# Patient Record
Sex: MALE | Race: WHITE | NOT HISPANIC OR LATINO | Employment: UNEMPLOYED | RURAL
[De-identification: names, ages, dates, MRNs, and addresses within clinical notes are randomized per-mention and may not be internally consistent; named-entity substitution may affect disease eponyms.]

---

## 2023-03-26 ENCOUNTER — HOSPITAL ENCOUNTER (INPATIENT)
Facility: HOSPITAL | Age: 33
LOS: 2 days | Discharge: ANOTHER HOSPITAL | DRG: 918 | End: 2023-03-28
Attending: EMERGENCY MEDICINE | Admitting: INTERNAL MEDICINE

## 2023-03-26 DIAGNOSIS — T39.1X2A INTENTIONAL ACETAMINOPHEN OVERDOSE, INITIAL ENCOUNTER: Primary | ICD-10-CM

## 2023-03-26 DIAGNOSIS — T14.91XA SUICIDE ATTEMPT: ICD-10-CM

## 2023-03-26 DIAGNOSIS — T50.902D INTENTIONAL OVERDOSE, SUBSEQUENT ENCOUNTER: ICD-10-CM

## 2023-03-26 DIAGNOSIS — T50.901A OVERDOSE: ICD-10-CM

## 2023-03-26 LAB
ALBUMIN SERPL BCP-MCNC: 3.9 G/DL (ref 3.5–5)
ALBUMIN/GLOB SERPL: 1.3 {RATIO}
ALP SERPL-CCNC: 176 U/L (ref 45–115)
ALT SERPL W P-5'-P-CCNC: 729 U/L (ref 16–61)
AMMONIA PLAS-SCNC: 40 ΜMOL/L (ref 11–32)
AMPHET UR QL SCN: POSITIVE
ANION GAP SERPL CALCULATED.3IONS-SCNC: 16 MMOL/L (ref 7–16)
APAP SERPL-MCNC: 16 ΜG/ML (ref 10–30)
APAP SERPL-MCNC: 35 ΜG/ML (ref 10–30)
APTT PPP: 29.1 SECONDS (ref 25.2–37.3)
AST SERPL W P-5'-P-CCNC: 497 U/L (ref 15–37)
BACTERIA #/AREA URNS HPF: ABNORMAL /HPF
BARBITURATES UR QL SCN: NEGATIVE
BASOPHILS # BLD AUTO: 0.08 K/UL (ref 0–0.2)
BASOPHILS NFR BLD AUTO: 0.5 % (ref 0–1)
BENZODIAZ METAB UR QL SCN: NEGATIVE
BILIRUB SERPL-MCNC: 2.8 MG/DL (ref ?–1.2)
BILIRUB UR QL STRIP: NEGATIVE
BUN SERPL-MCNC: 13 MG/DL (ref 7–18)
BUN/CREAT SERPL: 14 (ref 6–20)
CALCIUM SERPL-MCNC: 9.2 MG/DL (ref 8.5–10.1)
CANNABINOIDS UR QL SCN: POSITIVE
CHLORIDE SERPL-SCNC: 97 MMOL/L (ref 98–107)
CLARITY UR: CLEAR
CO2 SERPL-SCNC: 25 MMOL/L (ref 21–32)
COCAINE UR QL SCN: NEGATIVE
COLOR UR: YELLOW
CREAT SERPL-MCNC: 0.95 MG/DL (ref 0.7–1.3)
DIFFERENTIAL METHOD BLD: ABNORMAL
EGFR (NO RACE VARIABLE) (RUSH/TITUS): 110 ML/MIN/1.73M²
EOSINOPHIL # BLD AUTO: 0.02 K/UL (ref 0–0.5)
EOSINOPHIL NFR BLD AUTO: 0.1 % (ref 1–4)
ERYTHROCYTE [DISTWIDTH] IN BLOOD BY AUTOMATED COUNT: 13.6 % (ref 11.5–14.5)
ETHANOL, BLOOD (CATEGORY): NOT DETECTED
GLOBULIN SER-MCNC: 3.1 G/DL (ref 2–4)
GLUCOSE SERPL-MCNC: 154 MG/DL (ref 74–106)
GLUCOSE UR STRIP-MCNC: 70 MG/DL
HCO3 UR-SCNC: 29.1 MMOL/L (ref 24–28)
HCT VFR BLD AUTO: 49.8 % (ref 40–54)
HCT VFR BLD CALC: 51 % (ref 35–51)
HGB BLD-MCNC: 16.1 G/DL (ref 13.5–18)
HYALINE CASTS #/AREA URNS LPF: ABNORMAL /LPF
IMM GRANULOCYTES # BLD AUTO: 0.07 K/UL (ref 0–0.04)
IMM GRANULOCYTES NFR BLD: 0.4 % (ref 0–0.4)
INR BLD: 1.56
KETONES UR STRIP-SCNC: 150 MG/DL
LDH SERPL L TO P-CCNC: 2 MMOL/L (ref 0.3–1.2)
LEUKOCYTE ESTERASE UR QL STRIP: ABNORMAL
LYMPHOCYTES # BLD AUTO: 1.33 K/UL (ref 1–4.8)
LYMPHOCYTES NFR BLD AUTO: 7.6 % (ref 27–41)
MCH RBC QN AUTO: 29.6 PG (ref 27–31)
MCHC RBC AUTO-ENTMCNC: 32.3 G/DL (ref 32–36)
MCV RBC AUTO: 91.5 FL (ref 80–96)
MONOCYTES # BLD AUTO: 0.83 K/UL (ref 0–0.8)
MONOCYTES NFR BLD AUTO: 4.7 % (ref 2–6)
MPC BLD CALC-MCNC: 9.5 FL (ref 9.4–12.4)
NEUTROPHILS # BLD AUTO: 15.24 K/UL (ref 1.8–7.7)
NEUTROPHILS NFR BLD AUTO: 86.7 % (ref 53–65)
NITRITE UR QL STRIP: NEGATIVE
NRBC # BLD AUTO: 0 X10E3/UL
NRBC, AUTO (.00): 0 %
OPIATES UR QL SCN: NEGATIVE
PCO2 BLDA: 47 MMHG (ref 41–51)
PCP UR QL SCN: NEGATIVE
PH SMN: 7.4 [PH] (ref 7.32–7.42)
PH UR STRIP: 6 PH UNITS
PHOSPHATE SERPL-MCNC: 3.9 MG/DL (ref 2.5–4.5)
PLATELET # BLD AUTO: 488 K/UL (ref 150–400)
PO2 BLDA: 28 MMHG (ref 25–40)
POC BASE EXCESS: 3.3 MMOL/L (ref -2–3)
POC CO2: 30.5 MMOL/L
POC IONIZED CALCIUM: 1.04 MMOL/L (ref 1.15–1.35)
POC SATURATED O2: 53 % (ref 40–70)
POCT GLUCOSE: 156 MG/DL (ref 60–95)
POTASSIUM BLD-SCNC: 3.9 MMOL/L (ref 3.4–4.5)
POTASSIUM SERPL-SCNC: 4.9 MMOL/L (ref 3.5–5.1)
PROT SERPL-MCNC: 7 G/DL (ref 6.4–8.2)
PROT UR QL STRIP: 30
PROTHROMBIN TIME: 18 SECONDS (ref 11.7–14.7)
RBC # BLD AUTO: 5.44 M/UL (ref 4.6–6.2)
RBC # UR STRIP: NEGATIVE /UL
RBC #/AREA URNS HPF: ABNORMAL /HPF
SALICYLATES SERPL-MCNC: <0.2 MG/DL (ref 3–30)
SARS-COV-2 RDRP RESP QL NAA+PROBE: NEGATIVE
SODIUM BLD-SCNC: 129 MMOL/L (ref 136–145)
SODIUM SERPL-SCNC: 133 MMOL/L (ref 136–145)
SP GR UR STRIP: 1.03
SQUAMOUS #/AREA URNS LPF: ABNORMAL /LPF
UROBILINOGEN UR STRIP-ACNC: 6 MG/DL
WBC # BLD AUTO: 17.57 K/UL (ref 4.5–11)
WBC #/AREA URNS HPF: ABNORMAL /HPF
YEAST #/AREA URNS HPF: ABNORMAL /HPF

## 2023-03-26 PROCEDURE — 63600175 PHARM REV CODE 636 W HCPCS

## 2023-03-26 PROCEDURE — 82330 ASSAY OF CALCIUM: CPT

## 2023-03-26 PROCEDURE — 82947 ASSAY GLUCOSE BLOOD QUANT: CPT

## 2023-03-26 PROCEDURE — 96365 THER/PROPH/DIAG IV INF INIT: CPT

## 2023-03-26 PROCEDURE — 93010 EKG 12-LEAD: ICD-10-PCS | Mod: ,,, | Performed by: INTERNAL MEDICINE

## 2023-03-26 PROCEDURE — 99285 EMERGENCY DEPT VISIT HI MDM: CPT | Mod: ,,,

## 2023-03-26 PROCEDURE — 99285 PR EMERGENCY DEPT VISIT,LEVEL V: ICD-10-PCS | Mod: ,,,

## 2023-03-26 PROCEDURE — 83605 ASSAY OF LACTIC ACID: CPT

## 2023-03-26 PROCEDURE — 82140 ASSAY OF AMMONIA: CPT | Performed by: EMERGENCY MEDICINE

## 2023-03-26 PROCEDURE — 25000003 PHARM REV CODE 250

## 2023-03-26 PROCEDURE — 85014 HEMATOCRIT: CPT

## 2023-03-26 PROCEDURE — 93005 ELECTROCARDIOGRAM TRACING: CPT

## 2023-03-26 PROCEDURE — 84100 ASSAY OF PHOSPHORUS: CPT | Performed by: EMERGENCY MEDICINE

## 2023-03-26 PROCEDURE — 87635 SARS-COV-2 COVID-19 AMP PRB: CPT

## 2023-03-26 PROCEDURE — 99285 EMERGENCY DEPT VISIT HI MDM: CPT

## 2023-03-26 PROCEDURE — 99222 1ST HOSP IP/OBS MODERATE 55: CPT | Mod: ,,, | Performed by: INTERNAL MEDICINE

## 2023-03-26 PROCEDURE — 80143 DRUG ASSAY ACETAMINOPHEN: CPT | Performed by: INTERNAL MEDICINE

## 2023-03-26 PROCEDURE — 84295 ASSAY OF SERUM SODIUM: CPT

## 2023-03-26 PROCEDURE — 80307 DRUG TEST PRSMV CHEM ANLYZR: CPT

## 2023-03-26 PROCEDURE — 80143 DRUG ASSAY ACETAMINOPHEN: CPT

## 2023-03-26 PROCEDURE — 81001 URINALYSIS AUTO W/SCOPE: CPT

## 2023-03-26 PROCEDURE — 85025 COMPLETE CBC W/AUTO DIFF WBC: CPT

## 2023-03-26 PROCEDURE — 99222 PR INITIAL HOSPITAL CARE,LEVL II: ICD-10-PCS | Mod: ,,, | Performed by: INTERNAL MEDICINE

## 2023-03-26 PROCEDURE — 80179 DRUG ASSAY SALICYLATE: CPT

## 2023-03-26 PROCEDURE — 82077 ASSAY SPEC XCP UR&BREATH IA: CPT

## 2023-03-26 PROCEDURE — 85730 THROMBOPLASTIN TIME PARTIAL: CPT

## 2023-03-26 PROCEDURE — 85610 PROTHROMBIN TIME: CPT

## 2023-03-26 PROCEDURE — 93010 ELECTROCARDIOGRAM REPORT: CPT | Mod: ,,, | Performed by: INTERNAL MEDICINE

## 2023-03-26 PROCEDURE — 84132 ASSAY OF SERUM POTASSIUM: CPT

## 2023-03-26 PROCEDURE — 96376 TX/PRO/DX INJ SAME DRUG ADON: CPT

## 2023-03-26 PROCEDURE — 80053 COMPREHEN METABOLIC PANEL: CPT

## 2023-03-26 PROCEDURE — 20000000 HC ICU ROOM

## 2023-03-26 PROCEDURE — 82803 BLOOD GASES ANY COMBINATION: CPT

## 2023-03-26 RX ORDER — TALC
6 POWDER (GRAM) TOPICAL NIGHTLY PRN
Status: DISCONTINUED | OUTPATIENT
Start: 2023-03-26 | End: 2023-03-29 | Stop reason: HOSPADM

## 2023-03-26 RX ORDER — POLYETHYLENE GLYCOL 3350 17 G/17G
17 POWDER, FOR SOLUTION ORAL DAILY
Status: DISCONTINUED | OUTPATIENT
Start: 2023-03-27 | End: 2023-03-29 | Stop reason: HOSPADM

## 2023-03-26 RX ORDER — ACETAMINOPHEN 325 MG/1
650 TABLET ORAL EVERY 4 HOURS PRN
Status: DISCONTINUED | OUTPATIENT
Start: 2023-03-26 | End: 2023-03-26

## 2023-03-26 RX ORDER — LANOLIN ALCOHOL/MO/W.PET/CERES
800 CREAM (GRAM) TOPICAL
Status: DISCONTINUED | OUTPATIENT
Start: 2023-03-26 | End: 2023-03-29 | Stop reason: HOSPADM

## 2023-03-26 RX ORDER — NALOXONE HCL 0.4 MG/ML
0.02 VIAL (ML) INJECTION
Status: DISCONTINUED | OUTPATIENT
Start: 2023-03-26 | End: 2023-03-29 | Stop reason: HOSPADM

## 2023-03-26 RX ORDER — SODIUM,POTASSIUM PHOSPHATES 280-250MG
2 POWDER IN PACKET (EA) ORAL
Status: DISCONTINUED | OUTPATIENT
Start: 2023-03-26 | End: 2023-03-29 | Stop reason: HOSPADM

## 2023-03-26 RX ORDER — SODIUM CHLORIDE 0.9 % (FLUSH) 0.9 %
10 SYRINGE (ML) INJECTION
Status: DISCONTINUED | OUTPATIENT
Start: 2023-03-26 | End: 2023-03-29 | Stop reason: HOSPADM

## 2023-03-26 RX ORDER — GLUCAGON 1 MG
1 KIT INJECTION
Status: DISCONTINUED | OUTPATIENT
Start: 2023-03-26 | End: 2023-03-29 | Stop reason: HOSPADM

## 2023-03-26 RX ORDER — ACETAMINOPHEN 500 MG
1000 TABLET ORAL EVERY 8 HOURS PRN
Status: DISCONTINUED | OUTPATIENT
Start: 2023-03-26 | End: 2023-03-26

## 2023-03-26 RX ADMIN — ACETYLCYSTEINE 10900 MG: 200 INJECTION INTRAVENOUS at 03:03

## 2023-03-26 RX ADMIN — ACETYLCYSTEINE 7300 MG: 200 INJECTION INTRAVENOUS at 09:03

## 2023-03-26 RX ADMIN — ACETYLCYSTEINE 3600 MG: 200 INJECTION INTRAVENOUS at 04:03

## 2023-03-26 NOTE — ED PROVIDER NOTES
Encounter Date: 3/26/2023       History     Chief Complaint   Patient presents with    Drug Overdose     Patient is a 33 yo male who presents to ED via EMS after he reportedly took 500 tablets of Acetaminophen 500 mg yesterday around 2 pm. Patient reports nausea and vomiting. Denies pain. States he was trying to kill himself. History of previous suicide attempt with tylenol overdose approximately 3 years ago. Denies any other drug or alcohol use.     The history is provided by the patient and the EMS personnel.   Review of patient's allergies indicates:  No Known Allergies  History reviewed. No pertinent past medical history.  History reviewed. No pertinent surgical history.  History reviewed. No pertinent family history.  Social History     Tobacco Use    Smoking status: Every Day     Types: Cigarettes    Smokeless tobacco: Never   Substance Use Topics    Alcohol use: Not Currently    Drug use: Not Currently     Review of Systems   Constitutional:  Negative for fever.   HENT:  Negative for sore throat.    Respiratory:  Negative for shortness of breath.    Cardiovascular:  Negative for chest pain.   Gastrointestinal:  Positive for nausea and vomiting.   Genitourinary:  Negative for dysuria.   Musculoskeletal:  Negative for back pain.   Skin:  Negative for rash.   Neurological:  Negative for weakness.   Hematological:  Does not bruise/bleed easily.   Psychiatric/Behavioral:  Positive for suicidal ideas.      Physical Exam     Initial Vitals [03/26/23 1412]   BP Pulse Resp Temp SpO2   134/69 89 16 97.8 °F (36.6 °C) 100 %      MAP       --         Physical Exam    Vitals reviewed.  Constitutional: No distress.   HENT:   Head: Normocephalic.   Mouth/Throat: Oropharynx is clear and moist.   Eyes: Conjunctivae are normal. Pupils are equal, round, and reactive to light.   Neck: Neck supple.   Normal range of motion.  Cardiovascular:  Normal rate, regular rhythm, normal heart sounds and intact distal pulses.            Pulmonary/Chest: Breath sounds normal. No respiratory distress.   Abdominal: Abdomen is soft. Bowel sounds are normal. There is no abdominal tenderness.   Musculoskeletal:         General: No tenderness or edema. Normal range of motion.      Cervical back: Normal range of motion and neck supple.     Neurological: He is alert and oriented to person, place, and time. He has normal strength.   Skin: Skin is warm and dry.   Psychiatric: He expresses suicidal ideation.       Medical Screening Exam   See Full Note    ED Course   Procedures  Labs Reviewed   COMPREHENSIVE METABOLIC PANEL - Abnormal; Notable for the following components:       Result Value    Sodium 133 (*)     Chloride 97 (*)     Glucose 154 (*)     Bilirubin, Total 2.8 (*)     Alk Phos 176 (*)      (*)      (*)     All other components within normal limits   PROTIME-INR - Abnormal; Notable for the following components:    PT 18.0 (*)     All other components within normal limits   ACETAMINOPHEN LEVEL - Abnormal; Notable for the following components:    Acetaminophen 35 (*)     All other components within normal limits   SALICYLATE LEVEL - Abnormal; Notable for the following components:    Salicylate <0.2 (*)     All other components within normal limits   URINALYSIS, REFLEX TO URINE CULTURE - Abnormal; Notable for the following components:    Leukocytes, UA Small (*)     Protein, UA 30 (*)     Glucose, UA 70 (*)     Ketones,  (*)     Urobilinogen, UA 6 (*)     Specific Gravity, UA 1.032 (*)     All other components within normal limits   CBC WITH DIFFERENTIAL - Abnormal; Notable for the following components:    WBC 17.57 (*)     Platelet Count 488 (*)     Neutrophils % 86.7 (*)     Lymphocytes % 7.6 (*)     Eosinophils % 0.1 (*)     Neutrophils, Abs 15.24 (*)     Monocytes, Absolute 0.83 (*)     Immature Granulocytes, Absolute 0.07 (*)     All other components within normal limits   AMMONIA - Abnormal; Notable for the following  components:    Ammonia 40 (*)     All other components within normal limits   URINALYSIS, MICROSCOPIC - Abnormal; Notable for the following components:    Hyaline Casts, UA 0-2 (*)     All other components within normal limits   APTT - Normal   SARS-COV-2 RNA AMPLIFICATION, QUAL - Normal    Narrative:     Negative SARS-CoV results should not be used as the sole basis for treatment or patient management decisions; negative results should be considered in the context of a patient's recent exposures, history and the presene of clinical signs and symptoms consistent with COVID-19.  Negative results should be treated as presumptive and confirmed by molecular assay, if necessary for patient management.   PHOSPHORUS - Normal   CBC W/ AUTO DIFFERENTIAL    Narrative:     The following orders were created for panel order CBC auto differential.  Procedure                               Abnormality         Status                     ---------                               -----------         ------                     CBC with Differential[[id number]]        Abnormal            Final result                 Please view results for these tests on the individual orders.   ALCOHOL,MEDICAL (ETHANOL)        ECG Results              EKG 12-lead (In process)  Result time 03/27/23 06:40:20      In process by Interface, Lab In [facility] (03/27/23 06:40:20)                   Narrative:    Test Reason : T50.901A,    Vent. Rate : 078 BPM     Atrial Rate : 000 BPM     P-R Int : 100 ms          QRS Dur : 094 ms      QT Int : 396 ms       P-R-T Axes : 077 080 066 degrees     QTc Int : 428 ms    Sinus arrhythmia  Short SC interval  Borderline ECG      Referred By: [email or web address]   SELF           Confirmed By:                                   Imaging Results    None          Medications   sodium chloride 0.9% flush 10 mL (has no administration in time range)   melatonin tablet 6 mg (has no administration in time range)   polyethylene glycol packet 17 g  (17 g Oral Given 3/27/23 0825)   naloxone 0.4 mg/mL injection 0.02 mg (has no administration in time range)   potassium bicarbonate disintegrating tablet 50 mEq (has no administration in time range)   potassium bicarbonate disintegrating tablet 35 mEq (has no administration in time range)   potassium bicarbonate disintegrating tablet 60 mEq (has no administration in time range)   magnesium oxide tablet 800 mg (has no administration in time range)   magnesium oxide tablet 800 mg (has no administration in time range)   potassium, sodium phosphates 280-160-250 mg packet 2 packet (has no administration in time range)   potassium, sodium phosphates 280-160-250 mg packet 2 packet (has no administration in time range)   potassium, sodium phosphates 280-160-250 mg packet 2 packet (has no administration in time range)   glucagon (human recombinant) injection 1 mg (has no administration in time range)   dextrose 10% bolus 250 mL 250 mL (has no administration in time range)   nicotine 21 mg/24 hr 1 patch (1 patch Transdermal Patch Applied 3/27/23 1402)   mupirocin 2 % ointment ( Nasal Given 3/27/23 1747)   acetylcysteine 20% (200 mg/ml) (ACETADOTE) 7,300 mg in dextrose 5 % (D5W) 1,000 mL infusion ( Intravenous Verify Only 3/27/23 1801)   acetylcysteine 20% (200 mg/ml) (ACETADOTE) 10,900 mg in dextrose 5 % (D5W) 250 mL infusion (0 mg Intravenous Stopped 3/26/23 1610)   acetylcysteine 20% (200 mg/ml) (ACETADOTE) 3,600 mg in dextrose 5 % (D5W) 500 mL infusion (0 mg Intravenous Stopped 3/26/23 2141)   acetylcysteine 20% (200 mg/ml) (ACETADOTE) 7,300 mg in dextrose 5 % (D5W) 1,000 mL infusion (0 mg Intravenous Stopped 3/27/23 1342)     Medical Decision Making:   Clinical Tests:   Lab Tests: Ordered and Reviewed  The following lab test(s) were unremarkable: CBC and CMP       <> Summary of Lab: Tbili elevated at 2.8; ; ;    ED Management:  RN spoke with Morenita at [facility]. She recommends all overdose  labs (tylenol level, salicylate level, UA, Drug screen, Ethanol)... Physician might want to start the Mucomyst before tylenol level returns... Bag 1 (150mg/kg in 200ml d5w, 60 minz), Bag 2 (50mg/kg in 500ml d5w, 4 hrs), Bag 3 (100mg/kg in 1000 d5w, 16 hrs) CMP 2 hours before 3rd bags is over check liver enzymes & INR. Tylenol might not be terribly high but the liver function is what we will base it on. She will call back in 2 hours.    NAC infusing; patient alert, oriented. Will admit to ICU.     [facility]    Patient presents for emergent evaluation of acute Tylenol overdose that poses a threat to life and/or bodily function.    In the ED patient found to have acute Tylenol overdose.    Emergency provider ordered labs and personally reviewed them.  Labs significant for elevated bilirubin and transaminases.    Emergency provider ordered EKG and personally reviewed it.  EKG significant for normal sinus rhythm no ST elevation.      Admission [facility]  Emergency provider discussed the patient presentation labs, ekg, X-rays, CT findings with the consultant for hospital medicine/ICU team (speciality).    Patient was managed in the ED with IV N-acetylcysteine.    The response to treatment was stable.    Patient required emergent consultation to Hospital Medicine (admitting physician) for admission.             Attending Attestation:     Physician Attestation Statement for NP/PA:   I reviewed the chart but I did not personally examine the patient. The face to face encounter was performed by the NP/PA.    Other NP/PA Attestation Additions:      Medical Decision Making: Discussed case with nurse practitioner we decided to admit the patient for significant Tylenol overdose with some abnormality of bilirubin and transaminases.  Started on N-acetylcysteine.                 Clinical Impression:   Final diagnoses:  [T50.901A] Overdose  [T39.1X2A] Intentional acetaminophen overdose, initial encounter (Primary)        ED Disposition Condition     Admit Stable                CYNTHIA Solano  03/26/23 1636       Luisito Montalvo MD  03/27/23 8784

## 2023-03-26 NOTE — H&P
[facility] Hospital Medicine  History & Physical    Patient Name: Tristin Desai  MRN: [id number]  Patient Class: IP- Inpatient  Admission Date: 3/26/2023  Attending Physician: Kirby Arenas MD   Primary Care Provider: Primary Doctor No         Patient information was obtained from patient and ER records.     Subjective:     Principal Problem:Overdose    Chief Complaint:   Chief Complaint   Patient presents with    Drug Overdose        HPI: Chief complaint  I tried to kill myself     History of present illness   32-year-old gentleman presents to the hospital after stating that yesterday at approximately 3:00 p.m. he had taken 500 of 500 mg of Tylenol.  Patient states that after being home for several hours and earlier today he had severe nausea and vomiting then he called 911.  Patient stated he was suicidal yesterday because his children were taken away from him by the state.  Patient claims that now today he is not suicidal.  Patient has been started on N-acetylcysteine, and will be placed in the intensive care unit.  First acetaminophen level is 35.        History reviewed. No pertinent past medical history.    History reviewed. No pertinent surgical history.    Review of patient's allergies indicates:  No Known Allergies    No current facility-administered medications on file prior to encounter.     No current outpatient medications on file prior to encounter.     Family History    None       Tobacco Use    Smoking status: Every Day     Types: Cigarettes    Smokeless tobacco: Never   Substance and Sexual Activity    Alcohol use: Not Currently    Drug use: Not Currently    Sexual activity: Not Currently     Review of Systems   Constitutional:  Negative for activity change and fatigue.   HENT:  Negative for congestion and sore throat.    Eyes:  Negative for redness and visual disturbance.   Respiratory:  Negative for cough, chest tightness and shortness of breath.     Cardiovascular:  Negative for chest pain, palpitations and leg swelling.   Gastrointestinal:  Positive for nausea and vomiting. Negative for abdominal pain, blood in stool, constipation and diarrhea.   Endocrine: Negative for polydipsia and polyuria.   Genitourinary:  Negative for difficulty urinating and dysuria.   Musculoskeletal:  Negative for arthralgias and gait problem.   Skin:  Negative for rash and wound.   Neurological:  Negative for dizziness and weakness.   Psychiatric/Behavioral:  Negative for confusion. The patient is not nervous/anxious.    Objective:     Vital Signs (Most Recent):  Temp: 98.1 °F (36.7 °C) (03/26/23 1700)  Pulse: 67 (03/26/23 1830)  Resp: 15 (03/26/23 1830)  BP: (!) 143/76 (03/26/23 1830)  SpO2: 99 % (03/26/23 1830)   Vital Signs (24h Range):  Temp:  [97.8 °F (36.6 °C)-98.1 °F (36.7 °C)] 98.1 °F (36.7 °C)  Pulse:  [55-92] 67  Resp:  [13-18] 15  SpO2:  [97 %-100 %] 99 %  BP: (132-166)/(69-96) 143/76     Weight: 75.5 kg (166 lb 7.2 oz)  Body mass index is 26.06 kg/m².    Physical Exam  Vitals and nursing note reviewed.   Constitutional:       Appearance: Normal appearance.   HENT:      Head: Normocephalic and atraumatic.      Nose: Nose normal.      Mouth/Throat:      Mouth: Mucous membranes are moist.      Pharynx: Oropharynx is clear.   Eyes:      Extraocular Movements: Extraocular movements intact.      Pupils: Pupils are equal, round, and reactive to light.   Cardiovascular:      Rate and Rhythm: Normal rate and regular rhythm.      Pulses: Normal pulses.      Heart sounds: Normal heart sounds.   Pulmonary:      Effort: Pulmonary effort is normal.      Breath sounds: Normal breath sounds.   Abdominal:      General: Abdomen is flat. Bowel sounds are normal.      Palpations: Abdomen is soft.   Musculoskeletal:         General: Normal range of motion.      Cervical back: Normal range of motion and neck supple.      Right lower leg: No edema.      Left lower leg: No edema.   Skin:      General: Skin is warm and dry.      Coloration: Skin is not jaundiced or pale.      Findings: No rash.   Neurological:      General: No focal deficit present.      Mental Status: He is alert and oriented to person, place, and time. Mental status is at baseline.   Psychiatric:         Mood and Affect: Mood normal.         Thought Content: Thought content normal.         CRANIAL NERVES     CN III, IV, VI   Pupils are equal, round, and reactive to light.     Significant Labs: All pertinent labs within the past 24 hours have been reviewed.  CBC:   Recent Labs   Lab 03/26/23  1435 03/26/23  1647   WBC 17.57*  --    HGB 16.1  --    HCT 49.8 51   *  --      CMP:   Recent Labs   Lab 03/26/23  1435   *   K 4.9   CL 97*   CO2 25   *   BUN 13   CREATININE 0.95   CALCIUM 9.2   PROT 7.0   ALBUMIN 3.9   BILITOT 2.8*   ALKPHOS 176*   *   *   ANIONGAP 16     Recent Lab Results  (Last 5 results in the past 24 hours)        03/26/23  1649   03/26/23  1647   03/26/23  1639   03/26/23  1520   03/26/23  1435        POC CO2   30.5             Benzodiazepines Negative               Cocaine Negative  Comment:   The results of screening tests should be considered presumptive. Confirmatory testing is available upon request.    Cutoff Points:  PCP:         25ng/mL  AMPH:        500ng/mL  PARTHA:        200ng/mL  ERIBERTO:        200ng/mL  THC:         50ng/mL  BERTO:         300ng/mL  OPI:         2000ng/mL               BARBITURATES Negative               Albumin/Globulin Ratio         1.3       Acetaminophen (Tylenol), Serum         35       Albumin         3.9       Alcohol, Serum         Not Detected       Alkaline Phosphatase         176       ALT         729       Ammonia     40           Amphetamine, Urine Positive               Anion Gap         16       Appearance, UA Clear               aPTT         29.1       AST         497       Bacteria, UA Rare               Baso #         0.08       Basophil %          0.5       Bilirubin (UA) Negative               BILIRUBIN TOTAL         2.8       BUN         13       BUN/CREAT RATIO         14       Calcium         9.2       Cannabinoid Scrn, Ur Positive               Chloride         97       CO2         25       Color, UA Yellow               ID NOW COVID-19, (SURINDER)       Negative         Creatinine         0.95       Differential Type         Auto       eGFR         110       Eos #         0.02       Eosinophil %         0.1       Globulin, Total         3.1       Glucose         154       Glucose, UA 70               Hematocrit         49.8       Hemoglobin         16.1       Hyaline Casts, UA 0-2               Immature Grans (Abs)         0.07       Immature Granulocytes         0.4       INR         1.56       Ketones,                Leukocytes, UA Small               Lymph #         1.33       Lymph %         7.6       MCH         29.6       MCHC         32.3       MCV         91.5       Mono #         0.83       Mono %         4.7       MPV         9.5       Neutrophils, Abs         15.24       Neutrophils Relative         86.7       NITRITE UA Negative               nRBC         0.0       NUCLEATED RBC ABSOLUTE         0.00       Occult Blood UA Negative               Opiate Scrn, Ur Negative               pH, UA 6.0               Phencyclidine, Urine Negative               Phosphorus         3.9       Platelets         488       POC Base Excess   3.3             POC HCO3   29.1             POC Hematocrit   51             POC Ionized Calcium   1.04             POC Lactate   2.0             POC PCO2   47             POC PH   7.40             POC PO2   28             POC Potassium   3.9             POC SATURATED O2   53             POC Sodium   129             POCT Glucose   156             Potassium         4.9       PROTEIN TOTAL         7.0       Protein, UA 30               Protime         18.0       RBC         5.44       RBC, UA 0-3               RDW          13.6       Salicylate Lvl         <0.2       Sodium         133       Specific Gravity, UA 1.032               Squam Epithel, UA None Seen To Occasional               UROBILINOGEN UA 6               WBC, UA 0-5               WBC         17.57       Yeast, UA None Seen                                      Significant Imaging: I have reviewed all pertinent imaging results/findings within the past 24 hours.    Assessment/Plan:     * Overdose  Tylenol overdose which was intentional.  Start n  acetylcysteine  Patient will need a psychiatric evaluation in the morning.      Intentional acetaminophen overdose  Follow Tylenol levels   n acetylcysteine  Follow LFTs        VTE Risk Mitigation (From admission, onward)         Ordered     IP VTE LOW RISK PATIENT  Once         03/26/23 1703     Place sequential compression device  Until discontinued         03/26/23 1703                           Kirby Arenas MD  Department of Hospital Medicine  [facility]

## 2023-03-26 NOTE — Clinical Note
Diagnosis: Intentional acetaminophen overdose, initial encounter [026509]   Admitting Provider:: GEETHA CRUZ [[id number]]   Future Attending Provider: GEETHA CRUZ [[id number]]   Reason for IP Medical Treatment  (Clinical interventions that can only be accomplished in the IP setting? ) :: treatment   I certify that Inpatient services for greater than or equal to 2 midnights are medically necessary:: Yes   Plans for Post-Acute care--if anticipated (pick the single best option):: A. No post acute care anticipated at this time

## 2023-03-26 NOTE — HPI
Chief complaint  I tried to kill myself     History of present illness   32-year-old gentleman presents to the hospital after stating that yesterday at approximately 3:00 p.m. he had taken 500 of 500 mg of Tylenol.  Patient states that after being home for several hours and earlier today he had severe nausea and vomiting then he called 911.  Patient stated he was suicidal yesterday because his children were taken away from him by the state.  Patient claims that now today he is not suicidal.  Patient has been started on N-acetylcysteine, and will be placed in the intensive care unit.  First acetaminophen level is 35.

## 2023-03-26 NOTE — ED TRIAGE NOTES
Presents to ED via EMS from home after patient reports he took 500 500mg tylenol tablets yesterday at 1400. Patient reports abdominal pain, reports he has been vomiting since yesterday.

## 2023-03-26 NOTE — ASSESSMENT & PLAN NOTE
Tylenol overdose which was intentional.  Start n  acetylcysteine  Patient will need a psychiatric evaluation in the morning.

## 2023-03-26 NOTE — SUBJECTIVE & OBJECTIVE
History reviewed. No pertinent past medical history.    History reviewed. No pertinent surgical history.    Review of patient's allergies indicates:  No Known Allergies    No current facility-administered medications on file prior to encounter.     No current outpatient medications on file prior to encounter.     Family History    None       Tobacco Use    Smoking status: Every Day     Types: Cigarettes    Smokeless tobacco: Never   Substance and Sexual Activity    Alcohol use: Not Currently    Drug use: Not Currently    Sexual activity: Not Currently     Review of Systems   Constitutional:  Negative for activity change and fatigue.   HENT:  Negative for congestion and sore throat.    Eyes:  Negative for redness and visual disturbance.   Respiratory:  Negative for cough, chest tightness and shortness of breath.    Cardiovascular:  Negative for chest pain, palpitations and leg swelling.   Gastrointestinal:  Positive for nausea and vomiting. Negative for abdominal pain, blood in stool, constipation and diarrhea.   Endocrine: Negative for polydipsia and polyuria.   Genitourinary:  Negative for difficulty urinating and dysuria.   Musculoskeletal:  Negative for arthralgias and gait problem.   Skin:  Negative for rash and wound.   Neurological:  Negative for dizziness and weakness.   Psychiatric/Behavioral:  Negative for confusion. The patient is not nervous/anxious.    Objective:     Vital Signs (Most Recent):  Temp: 98.1 °F (36.7 °C) (03/26/23 1700)  Pulse: 67 (03/26/23 1830)  Resp: 15 (03/26/23 1830)  BP: (!) 143/76 (03/26/23 1830)  SpO2: 99 % (03/26/23 1830)   Vital Signs (24h Range):  Temp:  [97.8 °F (36.6 °C)-98.1 °F (36.7 °C)] 98.1 °F (36.7 °C)  Pulse:  [55-92] 67  Resp:  [13-18] 15  SpO2:  [97 %-100 %] 99 %  BP: (132-166)/(69-96) 143/76     Weight: 75.5 kg (166 lb 7.2 oz)  Body mass index is 26.06 kg/m².    Physical Exam  Vitals and nursing note reviewed.   Constitutional:       Appearance: Normal appearance.    HENT:      Head: Normocephalic and atraumatic.      Nose: Nose normal.      Mouth/Throat:      Mouth: Mucous membranes are moist.      Pharynx: Oropharynx is clear.   Eyes:      Extraocular Movements: Extraocular movements intact.      Pupils: Pupils are equal, round, and reactive to light.   Cardiovascular:      Rate and Rhythm: Normal rate and regular rhythm.      Pulses: Normal pulses.      Heart sounds: Normal heart sounds.   Pulmonary:      Effort: Pulmonary effort is normal.      Breath sounds: Normal breath sounds.   Abdominal:      General: Abdomen is flat. Bowel sounds are normal.      Palpations: Abdomen is soft.   Musculoskeletal:         General: Normal range of motion.      Cervical back: Normal range of motion and neck supple.      Right lower leg: No edema.      Left lower leg: No edema.   Skin:     General: Skin is warm and dry.      Coloration: Skin is not jaundiced or pale.      Findings: No rash.   Neurological:      General: No focal deficit present.      Mental Status: He is alert and oriented to person, place, and time. Mental status is at baseline.   Psychiatric:         Mood and Affect: Mood normal.         Thought Content: Thought content normal.         CRANIAL NERVES     CN III, IV, VI   Pupils are equal, round, and reactive to light.     Significant Labs: All pertinent labs within the past 24 hours have been reviewed.  CBC:   Recent Labs   Lab 03/26/23  1435 03/26/23  1647   WBC 17.57*  --    HGB 16.1  --    HCT 49.8 51   *  --      CMP:   Recent Labs   Lab 03/26/23  1435   *   K 4.9   CL 97*   CO2 25   *   BUN 13   CREATININE 0.95   CALCIUM 9.2   PROT 7.0   ALBUMIN 3.9   BILITOT 2.8*   ALKPHOS 176*   *   *   ANIONGAP 16     Recent Lab Results  (Last 5 results in the past 24 hours)        03/26/23  1649   03/26/23  1647   03/26/23  1639   03/26/23  1520   03/26/23  1435        POC CO2   30.5             Benzodiazepines Negative               Cocaine  Negative  Comment:   The results of screening tests should be considered presumptive. Confirmatory testing is available upon request.    Cutoff Points:  PCP:         25ng/mL  AMPH:        500ng/mL  PARTHA:        200ng/mL  ERIBERTO:        200ng/mL  THC:         50ng/mL  BERTO:         300ng/mL  OPI:         2000ng/mL               BARBITURATES Negative               Albumin/Globulin Ratio         1.3       Acetaminophen (Tylenol), Serum         35       Albumin         3.9       Alcohol, Serum         Not Detected       Alkaline Phosphatase         176       ALT         729       Ammonia     40           Amphetamine, Urine Positive               Anion Gap         16       Appearance, UA Clear               aPTT         29.1       AST         497       Bacteria, UA Rare               Baso #         0.08       Basophil %         0.5       Bilirubin (UA) Negative               BILIRUBIN TOTAL         2.8       BUN         13       BUN/CREAT RATIO         14       Calcium         9.2       Cannabinoid Scrn, Ur Positive               Chloride         97       CO2         25       Color, UA Yellow               ID NOW COVID-19, (SURINDER)       Negative         Creatinine         0.95       Differential Type         Auto       eGFR         110       Eos #         0.02       Eosinophil %         0.1       Globulin, Total         3.1       Glucose         154       Glucose, UA 70               Hematocrit         49.8       Hemoglobin         16.1       Hyaline Casts, UA 0-2               Immature Grans (Abs)         0.07       Immature Granulocytes         0.4       INR         1.56       Ketones,                Leukocytes, UA Small               Lymph #         1.33       Lymph %         7.6       MCH         29.6       MCHC         32.3       MCV         91.5       Mono #         0.83       Mono %         4.7       MPV         9.5       Neutrophils, Abs         15.24       Neutrophils Relative         86.7       NITRITE UA  Negative               nRBC         0.0       NUCLEATED RBC ABSOLUTE         0.00       Occult Blood UA Negative               Opiate Scrn, Ur Negative               pH, UA 6.0               Phencyclidine, Urine Negative               Phosphorus         3.9       Platelets         488       POC Base Excess   3.3             POC HCO3   29.1             POC Hematocrit   51             POC Ionized Calcium   1.04             POC Lactate   2.0             POC PCO2   47             POC PH   7.40             POC PO2   28             POC Potassium   3.9             POC SATURATED O2   53             POC Sodium   129             POCT Glucose   156             Potassium         4.9       PROTEIN TOTAL         7.0       Protein, UA 30               Protime         18.0       RBC         5.44       RBC, UA 0-3               RDW         13.6       Salicylate Lvl         <0.2       Sodium         133       Specific Gravity, UA 1.032               Squam Epithel, UA None Seen To Occasional               UROBILINOGEN UA 6               WBC, UA 0-5               WBC         17.57       Yeast, UA None Seen                                      Significant Imaging: I have reviewed all pertinent imaging results/findings within the past 24 hours.

## 2023-03-27 PROBLEM — D72.829 LEUKOCYTOSIS: Status: ACTIVE | Noted: 2023-03-27

## 2023-03-27 LAB
ALBUMIN SERPL BCP-MCNC: 3.3 G/DL (ref 3.5–5)
ALBUMIN SERPL BCP-MCNC: 3.3 G/DL (ref 3.5–5)
ALBUMIN/GLOB SERPL: 1.1 {RATIO}
ALP SERPL-CCNC: 166 U/L (ref 45–115)
ALP SERPL-CCNC: 166 U/L (ref 45–115)
ALT SERPL W P-5'-P-CCNC: 1541 U/L (ref 16–61)
ALT SERPL W P-5'-P-CCNC: 1578 U/L (ref 16–61)
ANION GAP SERPL CALCULATED.3IONS-SCNC: 12 MMOL/L (ref 7–16)
APAP SERPL-MCNC: 2 ΜG/ML (ref 10–30)
APAP SERPL-MCNC: 2 ΜG/ML (ref 10–30)
APAP SERPL-MCNC: 3 ΜG/ML (ref 10–30)
APAP SERPL-MCNC: 8 ΜG/ML (ref 10–30)
APAP SERPL-MCNC: <2 ΜG/ML (ref 10–30)
APAP SERPL-MCNC: <2 ΜG/ML (ref 10–30)
AST SERPL W P-5'-P-CCNC: 896 U/L (ref 15–37)
AST SERPL W P-5'-P-CCNC: 910 U/L (ref 15–37)
BILIRUB DIRECT SERPL-MCNC: 0.7 MG/DL (ref 0–0.2)
BILIRUB SERPL-MCNC: 1.3 MG/DL (ref ?–1.2)
BILIRUB SERPL-MCNC: 1.4 MG/DL (ref ?–1.2)
BUN SERPL-MCNC: 11 MG/DL (ref 7–18)
BUN/CREAT SERPL: 12 (ref 6–20)
CALCIUM SERPL-MCNC: 9.1 MG/DL (ref 8.5–10.1)
CHLORIDE SERPL-SCNC: 100 MMOL/L (ref 98–107)
CO2 SERPL-SCNC: 29 MMOL/L (ref 21–32)
CREAT SERPL-MCNC: 0.93 MG/DL (ref 0.7–1.3)
EGFR (NO RACE VARIABLE) (RUSH/TITUS): 113 ML/MIN/1.73M²
GLOBULIN SER-MCNC: 3.1 G/DL (ref 2–4)
GLUCOSE SERPL-MCNC: 120 MG/DL (ref 74–106)
HAV IGM SER QL: NORMAL
HBV CORE IGM SER QL: NORMAL
HBV SURFACE AG SERPL QL IA: NORMAL
HCV AB SER QL: NORMAL
INR BLD: 2
POTASSIUM SERPL-SCNC: 4.4 MMOL/L (ref 3.5–5.1)
PROT SERPL-MCNC: 6.4 G/DL (ref 6.4–8.2)
PROT SERPL-MCNC: 6.7 G/DL (ref 6.4–8.2)
PROTHROMBIN TIME: 21.8 SECONDS (ref 11.7–14.7)
SODIUM SERPL-SCNC: 137 MMOL/L (ref 136–145)

## 2023-03-27 PROCEDURE — 97165 OT EVAL LOW COMPLEX 30 MIN: CPT

## 2023-03-27 PROCEDURE — 80076 HEPATIC FUNCTION PANEL: CPT | Performed by: NURSE PRACTITIONER

## 2023-03-27 PROCEDURE — 97161 PT EVAL LOW COMPLEX 20 MIN: CPT

## 2023-03-27 PROCEDURE — 80074 ACUTE HEPATITIS PANEL: CPT | Performed by: INTERNAL MEDICINE

## 2023-03-27 PROCEDURE — 25000003 PHARM REV CODE 250: Performed by: NURSE PRACTITIONER

## 2023-03-27 PROCEDURE — 80053 COMPREHEN METABOLIC PANEL: CPT | Performed by: INTERNAL MEDICINE

## 2023-03-27 PROCEDURE — 80143 DRUG ASSAY ACETAMINOPHEN: CPT | Performed by: INTERNAL MEDICINE

## 2023-03-27 PROCEDURE — 25000003 PHARM REV CODE 250: Performed by: INTERNAL MEDICINE

## 2023-03-27 PROCEDURE — S4991 NICOTINE PATCH NONLEGEND: HCPCS | Performed by: NURSE PRACTITIONER

## 2023-03-27 PROCEDURE — 87040 BLOOD CULTURE FOR BACTERIA: CPT | Performed by: INTERNAL MEDICINE

## 2023-03-27 PROCEDURE — 63600175 PHARM REV CODE 636 W HCPCS: Performed by: NURSE PRACTITIONER

## 2023-03-27 PROCEDURE — 85610 PROTHROMBIN TIME: CPT | Performed by: NURSE PRACTITIONER

## 2023-03-27 PROCEDURE — 99233 PR SUBSEQUENT HOSPITAL CARE,LEVL III: ICD-10-PCS | Mod: ,,, | Performed by: INTERNAL MEDICINE

## 2023-03-27 PROCEDURE — 11000001 HC ACUTE MED/SURG PRIVATE ROOM

## 2023-03-27 PROCEDURE — 99233 SBSQ HOSP IP/OBS HIGH 50: CPT | Mod: ,,, | Performed by: INTERNAL MEDICINE

## 2023-03-27 RX ORDER — MUPIROCIN 20 MG/G
OINTMENT TOPICAL 2 TIMES DAILY
Status: DISCONTINUED | OUTPATIENT
Start: 2023-03-27 | End: 2023-03-29 | Stop reason: HOSPADM

## 2023-03-27 RX ORDER — IBUPROFEN 200 MG
1 TABLET ORAL DAILY
Status: DISCONTINUED | OUTPATIENT
Start: 2023-03-27 | End: 2023-03-29 | Stop reason: HOSPADM

## 2023-03-27 RX ADMIN — POLYETHYLENE GLYCOL 3350 17 G: 17 POWDER, FOR SOLUTION ORAL at 08:03

## 2023-03-27 RX ADMIN — ACETYLCYSTEINE 7300 MG: 200 INJECTION INTRAVENOUS at 05:03

## 2023-03-27 RX ADMIN — MUPIROCIN: 20 OINTMENT TOPICAL at 05:03

## 2023-03-27 RX ADMIN — NICOTINE 1 PATCH: 21 PATCH, EXTENDED RELEASE TRANSDERMAL at 02:03

## 2023-03-27 NOTE — PROGRESS NOTES
[facility]  Pulmonology  Progress Note    Patient Name: Tristin Desai  MRN: [id number]  Admission Date: 3/26/2023  Hospital Length of Stay: 1 days  Code Status: Full Code  Attending Provider: Kirby Arenas MD  Primary Care Provider: Primary Doctor No   Principal Problem: Overdose    Subjective:     Interval History:  Patient without complaints this morning      Objective:     Vital Signs (Most Recent):  Temp: 98.4 °F (36.9 °C) (03/27/23 0303)  Pulse: (!) 57 (03/27/23 0530)  Resp: 14 (03/27/23 0530)  BP: (!) 143/84 (03/27/23 0530)  SpO2: 97 % (03/27/23 0530)   Vital Signs (24h Range):  Temp:  [97.7 °F (36.5 °C)-98.4 °F (36.9 °C)] 98.4 °F (36.9 °C)  Pulse:  [51-94] 57  Resp:  [11-23] 14  SpO2:  [92 %-100 %] 97 %  BP: (120-166)/(63-99) 143/84     Weight: 75.2 kg (165 lb 12.6 oz)  Body mass index is 25.96 kg/m².      Intake/Output Summary (Last 24 hours) at 3/27/2023 0544  Last data filed at 3/27/2023 0419  Gross per 24 hour   Intake 968.75 ml   Output 1700 ml   Net -731.25 ml       Physical Exam  Vitals reviewed.   Constitutional:       Appearance: Normal appearance.      Interventions: He is not intubated.  HENT:      Head: Normocephalic and atraumatic.      Nose: Nose normal.      Mouth/Throat:      Mouth: Mucous membranes are dry.      Pharynx: Oropharynx is clear.   Eyes:      Extraocular Movements: Extraocular movements intact.      Conjunctiva/sclera: Conjunctivae normal.      Pupils: Pupils are equal, round, and reactive to light.   Cardiovascular:      Rate and Rhythm: Normal rate.      Heart sounds: Normal heart sounds. No murmur heard.  Pulmonary:      Effort: Pulmonary effort is normal. He is not intubated.      Breath sounds: Normal breath sounds.   Abdominal:      General: Abdomen is flat. Bowel sounds are normal.      Palpations: Abdomen is soft.   Musculoskeletal:         General: Normal range of motion.      Cervical back: Normal range of motion and neck supple.      Right  lower leg: No edema.      Left lower leg: No edema.   Skin:     General: Skin is warm and dry.      Capillary Refill: Capillary refill takes less than 2 seconds.   Neurological:      General: No focal deficit present.      Mental Status: He is alert and oriented to person, place, and time.   Psychiatric:         Mood and Affect: Mood normal.         Behavior: Behavior normal.     Review of Systems    Vents:       Lines/Drains/Airways       Peripheral Intravenous Line  Duration                  Peripheral IV - Single Lumen 03/26/23 1452 18 G Left Antecubital <1 day         Peripheral IV - Single Lumen 03/26/23 1739 20 G Anterior;Left Forearm <1 day                    Significant Labs:    CBC/Anemia Profile:  Recent Labs   Lab 03/26/23  1435 03/26/23  1647   WBC 17.57*  --    HGB 16.1  --    HCT 49.8 51   *  --    MCV 91.5  --    RDW 13.6  --         Chemistries:  Recent Labs   Lab 03/26/23  1435   *   K 4.9   CL 97*   CO2 25   BUN 13   CREATININE 0.95   CALCIUM 9.2   ALBUMIN 3.9   PROT 7.0   BILITOT 2.8*   ALKPHOS 176*   *   *   PHOS 3.9       Recent Lab Results  (Last 5 results in the past 24 hours)        03/26/23  2346   03/26/23  1933   03/26/23  1649   03/26/23  1647   03/26/23  1639        POC CO2       30.5         Benzodiazepines     Negative           Cocaine     Negative  Comment:   The results of screening tests should be considered presumptive. Confirmatory testing is available upon request.    Cutoff Points:  PCP:         25ng/mL  AMPH:        500ng/mL  PARTHA:        200ng/mL  ERIBERTO:        200ng/mL  THC:         50ng/mL  BERTO:         300ng/mL  OPI:         2000ng/mL           BARBITURATES     Negative           Acetaminophen (Tylenol), Serum 8   16             Ammonia         40       Amphetamine, Urine     Positive           Appearance, UA     Clear           Bacteria, UA     Rare           Bilirubin (UA)     Negative           Cannabinoid Scrn, Ur     Positive           Color,  UA     Yellow           Glucose, UA     70           Hyaline Casts, UA     0-2           Ketones, UA     150           Leukocytes, UA     Small           NITRITE UA     Negative           Occult Blood UA     Negative           Opiate Scrn, Ur     Negative           pH, UA     6.0           Phencyclidine, Urine     Negative           POC Base Excess       3.3         POC HCO3       29.1         POC Hematocrit       51         POC Ionized Calcium       1.04         POC Lactate       2.0         POC PCO2       47         POC PH       7.40         POC PO2       28         POC Potassium       3.9         POC SATURATED O2       53         POC Sodium       129         POCT Glucose       156         Protein, UA     30           RBC, UA     0-3           Specific Gravity, UA     1.032           Squam Epithel, UA     None Seen To Occasional           UROBILINOGEN UA     6           WBC, UA     0-5           Yeast, UA     None Seen                                  Significant Imaging:  I have reviewed all pertinent imaging results/findings within the past 24 hours.    Assessment/Plan:     Psychiatric  Suicide attempt  Situation problems at home    Intentional acetaminophen overdose  Patient being followed along with poison control Center had initial Tylenol level of 35 was down to 8 on last check he had abnormal liver function test we will consult GI    Oncology  Leukocytosis  Uncertain of the cause may just be stress culture    Other  * Overdose  Patient overdosed on Tylenol do a home situation where his children were not able to be seen                 Ryan Bai MD  Pulmonology  [facility]

## 2023-03-27 NOTE — CONSULTS
[facility]  Gastroenterology  Consult Note    Patient Name: Tristin Desai  MRN: [id number]  Admission Date: 3/26/2023  Hospital Length of Stay: 1 days  Code Status: Full Code   Attending Provider: Kirby Arenas MD   Consulting Provider: Yaya Barry MD  Primary Care Physician: Primary Doctor No  Principal Problem:Overdose    Inpatient consult to Gastroenterology  Consult performed by: CYNTHIA Fernandez  Consult ordered by: Ryan Bai MD  Reason for consult: tylenol overdose  Assessment/Recommendations: Seen and examined. Agree with findings of note as detailed below. 33 yo male presents after acetaminophen overdose with suicide attempt.Reports he took 25 grams of Tylenol Saturday afternoon and presented ~24 hrs later with c/o nausea.Denies ETOH use recentluy past few years. He currently appears stable on Mucomyst with APAP level from 35 intially down to 2. He is alert and oriented. Labs reviewed- ALT 1500 today. Pt appears stable on Mucomyst tx after significant APAP ingestion. Continue same with Mucomyst protocol with clinical observation, daily INR levels and liver profile.       Subjective:     HPI:  Mr. Desai is a 32-year-old male who was admitted to the hospital with Tylenol drug overdose.  Patient is a good historian therefore information is obtained from patient review as well as chart review.  Patient has no significant prior medical history.  Patient states that he was having a difficult time dealing with a situation at home in which his children were reportedly taken away from him.  Therefore on Saturday afternoon around 3:00 he admits to taking 500 pills of 500 mg Tylenol.  He states within approximately an hour of doing so he became nauseated and not long after that he began vomiting.  He states he continued this for approximately 24 hours prior to calling 911 and being brought to the hospital.  He does admit to being suicidal at that time however once admitted  to the hospital on yesterday he states he no longer is suicidal.  On admission, patient did have labs obtained in which she had elevated WBCs at 17,000.  His H&H is 16/49.  His LFTs on yesterday showed a bilirubin of 2.8 with an AST of 497 and ALT of 729 and an alk phos of 176.  Today his bilirubin is down to 1.4 with an AST of 19 and ALT of 1541.  His initial acetaminophen level was at 35 however today it is trended down now to 2.  He did have abdominal ultrasound done that showed fatty infiltration of the liver with no other acute findings.  He had N-acetylcysteine initiated on admission and was placed in ICU. His nausea and vomiting has resolved at this time. He is having some mild abdominal discomfort this morning. He states he did drink alcohol in the past but has been reportedly abstinent the last several years. He admits to smoking THC.  He states he did attempt suicide prior to this several years ago with Tylenol but he does not recall much about that at this time.      History reviewed. No pertinent past medical history.    History reviewed. No pertinent surgical history.    Review of patient's allergies indicates:  No Known Allergies  Family History    None       Tobacco Use    Smoking status: Every Day     Types: Cigarettes    Smokeless tobacco: Never   Substance and Sexual Activity    Alcohol use: Not Currently    Drug use: Not Currently    Sexual activity: Not Currently     Review of Systems   Constitutional:  Negative for activity change, appetite change, fever and unexpected weight change.   HENT:  Negative for nosebleeds and trouble swallowing.    Eyes:  Negative for visual disturbance.   Respiratory:  Negative for cough and shortness of breath.    Cardiovascular:  Negative for chest pain.   Gastrointestinal:  Positive for abdominal pain. Negative for abdominal distention, anal bleeding, blood in stool, constipation, diarrhea, nausea and vomiting.   Endocrine: Negative for cold intolerance.    Genitourinary:  Negative for dysuria, frequency, hematuria and urgency.   Musculoskeletal:  Negative for arthralgias and myalgias.   Skin:  Negative for color change.   Neurological:  Negative for speech difficulty, weakness and headaches.   Psychiatric/Behavioral:  Positive for suicidal ideas (On admission. States no longer). Negative for agitation, confusion and hallucinations.    Objective:     Vital Signs (Most Recent):  Temp: 97.8 °F (36.6 °C) (03/27/23 1114)  Pulse: 66 (03/27/23 1300)  Resp: 15 (03/27/23 1300)  BP: 124/80 (03/27/23 1300)  SpO2: 95 % (03/27/23 1300) Vital Signs (24h Range):  Temp:  [97.7 °F (36.5 °C)-98.4 °F (36.9 °C)] 97.8 °F (36.6 °C)  Pulse:  [51-94] 66  Resp:  [11-23] 15  SpO2:  [92 %-100 %] 95 %  BP: (116-166)/(63-99) 124/80     Weight: 75.2 kg (165 lb 12.6 oz) (03/27/23 0242)  Body mass index is 25.96 kg/m².      Intake/Output Summary (Last 24 hours) at 3/27/2023 1312  Last data filed at 3/27/2023 1114  Gross per 24 hour   Intake 3554.59 ml   Output 2300 ml   Net 1254.59 ml       Lines/Drains/Airways       Peripheral Intravenous Line  Duration                  Peripheral IV - Single Lumen 03/26/23 1452 18 G Left Antecubital <1 day         Peripheral IV - Single Lumen 03/26/23 1739 20 G Anterior;Left Forearm <1 day                    Physical Exam    Significant Labs:  Recent Lab Results  (Last 5 results in the past 24 hours)        03/27/23  1209   03/27/23  0957   03/27/23  0744   03/27/23  0429   03/26/23  2346        Albumin/Globulin Ratio 1.1               Acetaminophen (Tylenol), Serum 2     2   3   8       Albumin 3.3   3.3             Alkaline Phosphatase 166   166             ALT 1,578   1,541             Anion Gap 12                  910             Bilirubin Direct   0.7             BILIRUBIN TOTAL 1.3   1.4             BUN 11               BUN/CREAT RATIO 12               Calcium 9.1               Chloride 100               CO2 29               Creatinine 0.93                eGFR 113               Globulin, Total 3.1               Glucose 120               Hep A IgM       Non-Reactive         Hep B C IgM       Non-Reactive         Hepatitis B Surface Ag       Non-Reactive         Hepatitis C Ab       Non-Reactive         Potassium 4.4               PROTEIN TOTAL 6.4   6.7             Sodium 137                                      Significant Imaging:  Imaging results within the past 24 hours have been reviewed.    Assessment/Plan:     Psychiatric  Intentional acetaminophen overdose  3/27/2023-patient admitted following Tylenol overdose.  Labs reviewed as above.  Patient is receiving N-acetylcysteine.  No further nausea or vomiting.  Imaging is reviewed.  We will continue to trend LFTs at this time.  Continue to avoid hepatotoxic medications when possible.  Will review case further with Dr. Barry, with plan and addendum to follow.        Thank you for your consult. I will follow-up with patient. Please contact us if you have any additional questions.    CYNTHIA Fernandez  Gastroenterology  [facility]

## 2023-03-27 NOTE — HPI
32-year-old white male who took a large 5. Mg tolerate states that after being home for several hours he would nausea and vomiting called by 911.  This was done because he was unable to see his children he denies any fever chills.  Poison Center was contacted he was placed on Mucomyst.  Patient denies any other problems at this time.  Workup in the ER showed a [place] count of [address], INR 1.56, chemistry profile showed glucose 154 alk-phos 176 total bili of 2.8 AST 47 an ALT of 729 had no alcohol in his system

## 2023-03-27 NOTE — SUBJECTIVE & OBJECTIVE
History reviewed. No pertinent past medical history.    History reviewed. No pertinent surgical history.    Review of patient's allergies indicates:  No Known Allergies  Family History    None       Tobacco Use    Smoking status: Every Day     Types: Cigarettes    Smokeless tobacco: Never   Substance and Sexual Activity    Alcohol use: Not Currently    Drug use: Not Currently    Sexual activity: Not Currently     Review of Systems   Constitutional:  Negative for activity change, appetite change, fever and unexpected weight change.   HENT:  Negative for nosebleeds and trouble swallowing.    Eyes:  Negative for visual disturbance.   Respiratory:  Negative for cough and shortness of breath.    Cardiovascular:  Negative for chest pain.   Gastrointestinal:  Positive for abdominal pain. Negative for abdominal distention, anal bleeding, blood in stool, constipation, diarrhea, nausea and vomiting.   Endocrine: Negative for cold intolerance.   Genitourinary:  Negative for dysuria, frequency, hematuria and urgency.   Musculoskeletal:  Negative for arthralgias and myalgias.   Skin:  Negative for color change.   Neurological:  Negative for speech difficulty, weakness and headaches.   Psychiatric/Behavioral:  Positive for suicidal ideas (On admission. States no longer). Negative for agitation, confusion and hallucinations.    Objective:     Vital Signs (Most Recent):  Temp: 97.8 °F (36.6 °C) (03/27/23 1114)  Pulse: 66 (03/27/23 1300)  Resp: 15 (03/27/23 1300)  BP: 124/80 (03/27/23 1300)  SpO2: 95 % (03/27/23 1300) Vital Signs (24h Range):  Temp:  [97.7 °F (36.5 °C)-98.4 °F (36.9 °C)] 97.8 °F (36.6 °C)  Pulse:  [51-94] 66  Resp:  [11-23] 15  SpO2:  [92 %-100 %] 95 %  BP: (116-166)/(63-99) 124/80     Weight: 75.2 kg (165 lb 12.6 oz) (03/27/23 0242)  Body mass index is 25.96 kg/m².      Intake/Output Summary (Last 24 hours) at 3/27/2023 1312  Last data filed at 3/27/2023 1114  Gross per 24 hour   Intake 3554.59 ml   Output 2300 ml    Net 1254.59 ml       Lines/Drains/Airways       Peripheral Intravenous Line  Duration                  Peripheral IV - Single Lumen 03/26/23 1452 18 G Left Antecubital <1 day         Peripheral IV - Single Lumen 03/26/23 1739 20 G Anterior;Left Forearm <1 day                    Physical Exam    Significant Labs:  Recent Lab Results  (Last 5 results in the past 24 hours)        03/27/23  1209   03/27/23  0957   03/27/23  0744   03/27/23  0429   03/26/23  2346        Albumin/Globulin Ratio 1.1               Acetaminophen (Tylenol), Serum 2     2   3   8       Albumin 3.3   3.3             Alkaline Phosphatase 166   166             ALT 1,578   1,541             Anion Gap 12                  910             Bilirubin Direct   0.7             BILIRUBIN TOTAL 1.3   1.4             BUN 11               BUN/CREAT RATIO 12               Calcium 9.1               Chloride 100               CO2 29               Creatinine 0.93               eGFR 113               Globulin, Total 3.1               Glucose 120               Hep A IgM       Non-Reactive         Hep B C IgM       Non-Reactive         Hepatitis B Surface Ag       Non-Reactive         Hepatitis C Ab       Non-Reactive         Potassium 4.4               PROTEIN TOTAL 6.4   6.7             Sodium 137                                      Significant Imaging:  Imaging results within the past 24 hours have been reviewed.

## 2023-03-27 NOTE — PLAN OF CARE
[facility] ICU  Initial Discharge Assessment       Primary Care Provider: Primary Doctor No    Admission Diagnosis: Overdose [T50.901A]  Intentional acetaminophen overdose, initial encounter [T39.1X2A]    Admission Date: 3/26/2023  Expected Discharge Date:          Payor: /     Extended Emergency Contact Information  Primary Emergency Contact: Tristin Lobo Sr  Mobile Phone: [phone]  Relation: Father  Preferred language: English   needed? No    Discharge Plan A: Home  Discharge Plan B: Home    No Pharmacies Listed    Initial Assessment (most recent)       Adult Discharge Assessment - 03/27/23 1035          Discharge Assessment    Assessment Type Discharge Planning Assessment     Source of Information patient     People in Home alone     Do you expect to return to your current living situation? Yes     Do you have help at home or someone to help you manage your care at home? No     Current cognitive status: Alert/Oriented     Readmission within 30 days? No     Patient currently being followed by outpatient case management? No     Do you currently have service(s) that help you manage your care at home? No     Do you take prescription medications? No     Do you have prescription coverage? No     Do you have any problems affording any of your prescribed medications? No     Is the patient taking medications as prescribed? no     Are you on dialysis? No     Do you take coumadin? No     Discharge Plan A Home     Discharge Plan B Home        Physical Activity    On average, how many days per week do you engage in moderate to strenuous exercise (like a brisk walk)? 0 days     On average, how many minutes do you engage in exercise at this level? 0 min        Financial Resource Strain    How hard is it for you to pay for the very basics like food, housing, medical care, and heating? Somewhat hard        Housing Stability    In the last 12 months, was there a time when you were not able to pay  the mortgage or rent on time? Yes     In the last 12 months, how many places have you lived? 1     In the last 12 months, was there a time when you did not have a steady place to sleep or slept in a shelter (including now)? No        Transportation Needs    In the past 12 months, has lack of transportation kept you from medical appointments or from getting medications? No     In the past 12 months, has lack of transportation kept you from meetings, work, or from getting things needed for daily living? No        Food Insecurity    Within the past 12 months, you worried that your food would run out before you got the money to buy more. Sometimes true     Within the past 12 months, the food you bought just didn't last and you didn't have money to get more. Sometimes true        Stress    Do you feel stress - tense, restless, nervous, or anxious, or unable to sleep at night because your mind is troubled all the time - these days? Very much        Social Connections    In a typical week, how many times do you talk on the phone with family, friends, or neighbors? Never     How often do you get together with friends or relatives? Never     How often do you attend Cheondoism or Mormonism services? Never     Do you belong to any clubs or organizations such as Cheondoism groups, unions, fraternal or athletic groups, or school groups? No     How often do you attend meetings of the clubs or organizations you belong to? Never     Are you , , , , never , or living with a partner? Never         Alcohol Use    Q1: How often do you have a drink containing alcohol? Never     Q2: How many drinks containing alcohol do you have on a typical day when you are drinking? Patient does not drink     Q3: How often do you have six or more drinks on one occasion? Never                 Spoke with patient at bedside. Pt lives alone. Denies any HH or DME. Plans to return home at [place]. SDOH completed at this time.  SS will follow for dc planning.

## 2023-03-27 NOTE — PLAN OF CARE
Problem: Physical Therapy  Goal: Physical Therapy Goal  Description: Short term goal:  To facilitate d/c home patient will demonstrate:  Safe transfers with no AD  Safe gait with no AD    Long term goal:  Patient to return to home situation and all prior ADLs  Outcome: Met     By end of evaluation/gait trial patient was able to demonstrate safe gait, no AD with no LOB. Anticipate return to independent living when medically stable for d/c. No skilled PT indicated.

## 2023-03-27 NOTE — PLAN OF CARE
Problem: Violence Risk or Actual  Goal: Anger and Impulse Control  Outcome: Ongoing, Progressing     Problem: Adult Inpatient Plan of Care  Goal: Plan of Care Review  Outcome: Ongoing, Progressing  Goal: Patient-Specific Goal (Individualized)  Outcome: Ongoing, Progressing  Goal: Absence of Hospital-Acquired Illness or Injury  Outcome: Ongoing, Progressing  Goal: Optimal Comfort and Wellbeing  Outcome: Ongoing, Progressing  Goal: Readiness for Transition of Care  Outcome: Ongoing, Progressing     Problem: Nausea and Vomiting  Goal: Fluid and Electrolyte Balance  Outcome: Ongoing, Progressing     Problem: Suicide Risk  Goal: Absence of Self-Harm  Outcome: Ongoing, Progressing     Problem: Suicidal Behavior  Goal: Suicidal Behavior is Absent or Managed  Outcome: Ongoing, Progressing

## 2023-03-27 NOTE — HPI
Mr. Desai is a 32-year-old male who was admitted to the hospital with Tylenol drug overdose.  Patient is a good historian therefore information is obtained from patient review as well as chart review.  Patient has no significant prior medical history.  Patient states that he was having a difficult time dealing with a situation at home in which his children were reportedly taken away from him.  Therefore on Saturday afternoon around 3:00 he admits to taking 500 pills of 500 mg Tylenol.  He states within approximately an hour of doing so he became nauseated and not long after that he began vomiting.  He states he continued this for approximately 24 hours prior to calling 911 and being brought to the hospital.  He does admit to being suicidal at that time however once admitted to the hospital on yesterday he states he no longer is suicidal.  On admission, patient did have labs obtained in which she had elevated WBCs at 17,000.  His H&H is 16/49.  His LFTs on yesterday showed a bilirubin of 2.8 with an AST of 497 and ALT of 729 and an alk phos of 176.  Today his bilirubin is down to 1.4 with an AST of 19 and ALT of 1541.  His initial acetaminophen level was at 35 however today it is trended down now to 2.  He did have abdominal ultrasound done that showed fatty infiltration of the liver with no other acute findings.  He had N-acetylcysteine initiated on admission and was placed in ICU. His nausea and vomiting has resolved at this time. He is having some mild abdominal discomfort this morning. He states he did drink alcohol in the past but has been reportedly abstinent the last several years. He admits to smoking THC.  He states he did attempt suicide prior to this several years ago with Tylenol but he does not recall much about that at this time.    3/28/23-Patieint is seen, in ICU, up wandering around room with multiple staff members present following initiation of a code strong. He reportedly was wanting to go outside  to smoke and when he wasn't allowed to he threatened to leave so he could go home and die. He was assisted back to his room and reportedly calmed down. Note at this time there is efforts to have patient transferred to [facility] due to worsening of his liver function overnight. His INR is elevated at 3.3 as well as further elevation in his transaminases with AST 4671, ALT 8802. His bilirubin is down at 0.9. Tylenol level remains down as well at 2. He has been complaining of some generalized malaise and nausea with no reports of vomiting. Denies any significant abdominal pain.

## 2023-03-27 NOTE — SUBJECTIVE & OBJECTIVE
Interval History:  Patient without complaints this morning      Objective:     Vital Signs (Most Recent):  Temp: 98.4 °F (36.9 °C) (03/27/23 0303)  Pulse: (!) 57 (03/27/23 0530)  Resp: 14 (03/27/23 0530)  BP: (!) 143/84 (03/27/23 0530)  SpO2: 97 % (03/27/23 0530)   Vital Signs (24h Range):  Temp:  [97.7 °F (36.5 °C)-98.4 °F (36.9 °C)] 98.4 °F (36.9 °C)  Pulse:  [51-94] 57  Resp:  [11-23] 14  SpO2:  [92 %-100 %] 97 %  BP: (120-166)/(63-99) 143/84     Weight: 75.2 kg (165 lb 12.6 oz)  Body mass index is 25.96 kg/m².      Intake/Output Summary (Last 24 hours) at 3/27/2023 0544  Last data filed at 3/27/2023 0419  Gross per 24 hour   Intake 968.75 ml   Output 1700 ml   Net -731.25 ml       Physical Exam  Vitals reviewed.   Constitutional:       Appearance: Normal appearance.      Interventions: He is not intubated.  HENT:      Head: Normocephalic and atraumatic.      Nose: Nose normal.      Mouth/Throat:      Mouth: Mucous membranes are dry.      Pharynx: Oropharynx is clear.   Eyes:      Extraocular Movements: Extraocular movements intact.      Conjunctiva/sclera: Conjunctivae normal.      Pupils: Pupils are equal, round, and reactive to light.   Cardiovascular:      Rate and Rhythm: Normal rate.      Heart sounds: Normal heart sounds. No murmur heard.  Pulmonary:      Effort: Pulmonary effort is normal. He is not intubated.      Breath sounds: Normal breath sounds.   Abdominal:      General: Abdomen is flat. Bowel sounds are normal.      Palpations: Abdomen is soft.   Musculoskeletal:         General: Normal range of motion.      Cervical back: Normal range of motion and neck supple.      Right lower leg: No edema.      Left lower leg: No edema.   Skin:     General: Skin is warm and dry.      Capillary Refill: Capillary refill takes less than 2 seconds.   Neurological:      General: No focal deficit present.      Mental Status: He is alert and oriented to person, place, and time.   Psychiatric:         Mood and Affect:  Mood normal.         Behavior: Behavior normal.     Review of Systems    Vents:       Lines/Drains/Airways       Peripheral Intravenous Line  Duration                  Peripheral IV - Single Lumen 03/26/23 1452 18 G Left Antecubital <1 day         Peripheral IV - Single Lumen 03/26/23 1739 20 G Anterior;Left Forearm <1 day                    Significant Labs:    CBC/Anemia Profile:  Recent Labs   Lab 03/26/23  1435 03/26/23  1647   WBC 17.57*  --    HGB 16.1  --    HCT 49.8 51   *  --    MCV 91.5  --    RDW 13.6  --         Chemistries:  Recent Labs   Lab 03/26/23  1435   *   K 4.9   CL 97*   CO2 25   BUN 13   CREATININE 0.95   CALCIUM 9.2   ALBUMIN 3.9   PROT 7.0   BILITOT 2.8*   ALKPHOS 176*   *   *   PHOS 3.9       Recent Lab Results  (Last 5 results in the past 24 hours)        03/26/23  2346   03/26/23  1933   03/26/23  1649   03/26/23  1647   03/26/23  1639        POC CO2       30.5         Benzodiazepines     Negative           Cocaine     Negative  Comment:   The results of screening tests should be considered presumptive. Confirmatory testing is available upon request.    Cutoff Points:  PCP:         25ng/mL  AMPH:        500ng/mL  PARTHA:        200ng/mL  ERIBERTO:        200ng/mL  THC:         50ng/mL  BERTO:         300ng/mL  OPI:         2000ng/mL           BARBITURATES     Negative           Acetaminophen (Tylenol), Serum 8   16             Ammonia         40       Amphetamine, Urine     Positive           Appearance, UA     Clear           Bacteria, UA     Rare           Bilirubin (UA)     Negative           Cannabinoid Scrn, Ur     Positive           Color, UA     Yellow           Glucose, UA     70           Hyaline Casts, UA     0-2           Ketones, UA     150           Leukocytes, UA     Small           NITRITE UA     Negative           Occult Blood UA     Negative           Opiate Scrn, Ur     Negative           pH, UA     6.0           Phencyclidine, Urine     Negative            POC Base Excess       3.3         POC HCO3       29.1         POC Hematocrit       51         POC Ionized Calcium       1.04         POC Lactate       2.0         POC PCO2       47         POC PH       7.40         POC PO2       28         POC Potassium       3.9         POC SATURATED O2       53         POC Sodium       129         POCT Glucose       156         Protein, UA     30           RBC, UA     0-3           Specific Gravity, UA     1.032           Squam Epithel, UA     None Seen To Occasional           UROBILINOGEN UA     6           WBC, UA     0-5           Yeast, UA     None Seen                                  Significant Imaging:  I have reviewed all pertinent imaging results/findings within the past 24 hours.

## 2023-03-27 NOTE — PT/OT/SLP EVAL
"Physical Therapy Evaluation and Discharge Note    Patient Name:  Tristin Desai   MRN:  [id number]    Recommendations:     Discharge Recommendations: home  Discharge Equipment Recommendations: none   Barriers to discharge: None    Assessment:     Tristin Desai is a 32 y.o. male admitted with a medical diagnosis of Overdose. .  At this time, patient is functioning at their prior level of function and does not require further acute PT services.     Recent Surgery: * No surgery found *      Plan:     During this hospitalization, patient does not require further acute PT services.  Please re-consult if situation changes.      Subjective     Chief Complaint: overdose  Patient/Family Comments/goals: "I have been tired but I feel pretty OK"  Pain/Comfort:  Pain Rating 1: 0/10  Pain Rating Post-Intervention 1: 0/10    Patients cultural, spiritual, Scientologist conflicts given the current situation: no    Living Environment:  Pt lives alone in a single level home with 3 steps/rail to enter  Prior to admission, patients level of function was independent.  Equipment used at home: none.  DME owned (not currently used): none.  Upon discharge, patient will have assistance from family PRN.    Objective:     Communicated with Mai Bennett RN prior to session.  Patient found supine with peripheral IV, telemetry, pulse ox (continuous), blood pressure cuff (sitter due to suicide ideation on admit) upon PT entry to room.    General Precautions: Standard, fall (suicide ideation on admit)    Orthopedic Precautions:N/A   Braces: N/A  Respiratory Status: Room air    Exams:  Cognitive Exam:  Patient is oriented to Person, Place, Time, and Situation  Sensation:    -       Intact  RLE ROM: WFL  RLE Strength: WFL  LLE ROM: WFL  LLE Strength: WFL    Functional Mobility:  Bed Mobility:     Supine to Sit: independence  Sit to Supine: independence  Transfers:     Sit to Stand:  independence with no AD  Gait: 200' with CGA/SBA initially with " stagger steps but improved to independent by end of trial; reciprocal pattern, adequate darius; pt able to sidestep through narrow openings with no LOB  Balance: sitting- good static and dynamic                 Standing- good static and dynamic after training/activity; retrieve object from the floor independently    AM-PAC 6 CLICK MOBILITY  Total Score:24       Treatment and Education:      AM-PAC 6 CLICK MOBILITY  Total Score:24     Patient left HOB elevated with all lines intact, call button in reach, Mai Bennett RN notified, and sitter present.    GOALS:   Multidisciplinary Problems       Physical Therapy Goals       Not on file              Multidisciplinary Problems (Resolved)          Problem: Physical Therapy    Goal Priority Disciplines Outcome Goal Variances Interventions   Physical Therapy Goal   (Resolved)     PT, PT/OT Met     Description: Short term goal:  To facilitate d/c home patient will demonstrate:  Safe transfers with no AD  Safe gait with no AD    Long term goal:  Patient to return to home situation and all prior ADLs                       History:     History reviewed. No pertinent past medical history.    History reviewed. No pertinent surgical history.    Time Tracking:     PT Received On: 03/27/23  PT Start Time: 0855     PT Stop Time: 0910  PT Total Time (min): 15 min     Billable Minutes: Evaluation Low complexity      03/27/2023

## 2023-03-27 NOTE — PLAN OF CARE
Plan of care reviewed with patient. Questions encouraged and answered throughout course of care. Further review needed.    Problem: Violence Risk or Actual  Goal: Anger and Impulse Control  Outcome: Ongoing, Progressing  Intervention: Promote Self-Control  Flowsheets (Taken 3/27/2023 0648)  Supportive Measures:   active listening utilized   positive reinforcement provided   relaxation techniques promoted   self-care encouraged   verbalization of feelings encouraged  Environmental Support:   calm environment promoted   environmental consistency promoted   rest periods encouraged     Problem: Adult Inpatient Plan of Care  Goal: Plan of Care Review  Outcome: Ongoing, Progressing  Goal: Patient-Specific Goal (Individualized)  Outcome: Ongoing, Progressing  Goal: Absence of Hospital-Acquired Illness or Injury  Outcome: Ongoing, Progressing  Goal: Optimal Comfort and Wellbeing  Outcome: Ongoing, Progressing  Goal: Readiness for Transition of Care  Outcome: Ongoing, Progressing     Problem: Nausea and Vomiting  Goal: Fluid and Electrolyte Balance  Outcome: Ongoing, Progressing     Problem: Suicide Risk  Goal: Absence of Self-Harm  Outcome: Ongoing, Progressing  Intervention: Assess Risk to Self and Maintain Safety  Flowsheets (Taken 3/27/2023 0647)  Behavior Management: security enhancements provided  Self-Harm Prevention:   environmental self-harm risks assessed   environment modified for self-harm risk   observed one-to-one  Intervention: Promote Psychosocial Wellbeing  Flowsheets (Taken 3/27/2023 0647)  Sleep/Rest Enhancement:   relaxation techniques promoted   room darkened   noise level reduced  Supportive Measures:   self-care encouraged   relaxation techniques promoted   positive reinforcement provided     Problem: Suicidal Behavior  Goal: Suicidal Behavior is Absent or Managed  Outcome: Ongoing, Progressing

## 2023-03-27 NOTE — ASSESSMENT & PLAN NOTE
Patient being followed along with poison control Center had initial Tylenol level of 35 was down to 8 on last check he had abnormal liver function test we will consult GI

## 2023-03-27 NOTE — ASSESSMENT & PLAN NOTE
3/27/2023-patient admitted following Tylenol overdose.  Labs reviewed as above.  Patient is receiving N-acetylcysteine.  No further nausea or vomiting.  Imaging is reviewed.  We will continue to trend LFTs at this time.  Continue to avoid hepatotoxic medications when possible.  Will review case further with Dr. Barry, with plan and addendum to follow.

## 2023-03-28 VITALS
OXYGEN SATURATION: 96 % | BODY MASS INDEX: 24.92 KG/M2 | HEART RATE: 93 BPM | TEMPERATURE: 99 F | DIASTOLIC BLOOD PRESSURE: 82 MMHG | SYSTOLIC BLOOD PRESSURE: 120 MMHG | HEIGHT: 67 IN | WEIGHT: 158.75 LBS | RESPIRATION RATE: 14 BRPM

## 2023-03-28 LAB
ALBUMIN SERPL BCP-MCNC: 3.2 G/DL (ref 3.5–5)
ALBUMIN SERPL BCP-MCNC: 3.4 G/DL (ref 3.5–5)
ALBUMIN/GLOB SERPL: 1 {RATIO}
ALBUMIN/GLOB SERPL: 1.1 {RATIO}
ALP SERPL-CCNC: 188 U/L (ref 45–115)
ALP SERPL-CCNC: 188 U/L (ref 45–115)
ALT SERPL W P-5'-P-CCNC: 8802 U/L (ref 16–61)
ALT SERPL W P-5'-P-CCNC: ABNORMAL U/L (ref 16–61)
ALT SERPL W P-5'-P-CCNC: ABNORMAL U/L (ref 16–61)
AMMONIA PLAS-SCNC: 72 ΜMOL/L (ref 11–32)
ANION GAP SERPL CALCULATED.3IONS-SCNC: 14 MMOL/L (ref 7–16)
ANION GAP SERPL CALCULATED.3IONS-SCNC: 14 MMOL/L (ref 7–16)
APAP SERPL-MCNC: <2 ΜG/ML (ref 10–30)
AST SERPL W P-5'-P-CCNC: 4671 U/L (ref 15–37)
AST SERPL W P-5'-P-CCNC: 6276 U/L (ref 15–37)
AST SERPL W P-5'-P-CCNC: 9629 U/L (ref 15–37)
BASOPHILS # BLD AUTO: 0.03 K/UL (ref 0–0.2)
BASOPHILS NFR BLD AUTO: 0.3 % (ref 0–1)
BILIRUB SERPL-MCNC: 0.9 MG/DL (ref ?–1.2)
BILIRUB SERPL-MCNC: 1.1 MG/DL (ref ?–1.2)
BUN SERPL-MCNC: 10 MG/DL (ref 7–18)
BUN SERPL-MCNC: 9 MG/DL (ref 7–18)
BUN/CREAT SERPL: 11 (ref 6–20)
BUN/CREAT SERPL: 13 (ref 6–20)
CALCIUM SERPL-MCNC: 9 MG/DL (ref 8.5–10.1)
CALCIUM SERPL-MCNC: 9.1 MG/DL (ref 8.5–10.1)
CHLORIDE SERPL-SCNC: 100 MMOL/L (ref 98–107)
CHLORIDE SERPL-SCNC: 99 MMOL/L (ref 98–107)
CO2 SERPL-SCNC: 26 MMOL/L (ref 21–32)
CO2 SERPL-SCNC: 26 MMOL/L (ref 21–32)
CREAT SERPL-MCNC: 0.77 MG/DL (ref 0.7–1.3)
CREAT SERPL-MCNC: 0.83 MG/DL (ref 0.7–1.3)
DIFFERENTIAL METHOD BLD: ABNORMAL
EGFR (NO RACE VARIABLE) (RUSH/TITUS): 121 ML/MIN/1.73M²
EGFR (NO RACE VARIABLE) (RUSH/TITUS): 124 ML/MIN/1.73M²
EOSINOPHIL # BLD AUTO: 0.03 K/UL (ref 0–0.5)
EOSINOPHIL NFR BLD AUTO: 0.3 % (ref 1–4)
ERYTHROCYTE [DISTWIDTH] IN BLOOD BY AUTOMATED COUNT: 13.5 % (ref 11.5–14.5)
GLOBULIN SER-MCNC: 3.1 G/DL (ref 2–4)
GLOBULIN SER-MCNC: 3.2 G/DL (ref 2–4)
GLUCOSE SERPL-MCNC: 106 MG/DL (ref 74–106)
GLUCOSE SERPL-MCNC: 148 MG/DL (ref 74–106)
HCT VFR BLD AUTO: 46.5 % (ref 40–54)
HGB BLD-MCNC: 15.3 G/DL (ref 13.5–18)
IMM GRANULOCYTES # BLD AUTO: 0.05 K/UL (ref 0–0.04)
IMM GRANULOCYTES NFR BLD: 0.4 % (ref 0–0.4)
INR BLD: 3.38
LYMPHOCYTES # BLD AUTO: 0.87 K/UL (ref 1–4.8)
LYMPHOCYTES NFR BLD AUTO: 7.6 % (ref 27–41)
MAGNESIUM SERPL-MCNC: 1.8 MG/DL (ref 1.7–2.3)
MCH RBC QN AUTO: 29.7 PG (ref 27–31)
MCHC RBC AUTO-ENTMCNC: 32.9 G/DL (ref 32–36)
MCV RBC AUTO: 90.1 FL (ref 80–96)
MONOCYTES # BLD AUTO: 0.75 K/UL (ref 0–0.8)
MONOCYTES NFR BLD AUTO: 6.6 % (ref 2–6)
MPC BLD CALC-MCNC: 10.6 FL (ref 9.4–12.4)
NEUTROPHILS # BLD AUTO: 9.65 K/UL (ref 1.8–7.7)
NEUTROPHILS NFR BLD AUTO: 84.8 % (ref 53–65)
NRBC # BLD AUTO: 0 X10E3/UL
NRBC, AUTO (.00): 0 %
PLATELET # BLD AUTO: 347 K/UL (ref 150–400)
POTASSIUM SERPL-SCNC: 3.8 MMOL/L (ref 3.5–5.1)
POTASSIUM SERPL-SCNC: 4.1 MMOL/L (ref 3.5–5.1)
PROT SERPL-MCNC: 6.3 G/DL (ref 6.4–8.2)
PROT SERPL-MCNC: 6.6 G/DL (ref 6.4–8.2)
PROTHROMBIN TIME: 32.7 SECONDS (ref 11.7–14.7)
RBC # BLD AUTO: 5.16 M/UL (ref 4.6–6.2)
SODIUM SERPL-SCNC: 135 MMOL/L (ref 136–145)
SODIUM SERPL-SCNC: 136 MMOL/L (ref 136–145)
WBC # BLD AUTO: 11.38 K/UL (ref 4.5–11)

## 2023-03-28 PROCEDURE — 25000003 PHARM REV CODE 250: Performed by: NURSE PRACTITIONER

## 2023-03-28 PROCEDURE — 83735 ASSAY OF MAGNESIUM: CPT | Performed by: INTERNAL MEDICINE

## 2023-03-28 PROCEDURE — 84460 ALANINE AMINO (ALT) (SGPT): CPT | Performed by: NURSE PRACTITIONER

## 2023-03-28 PROCEDURE — 84450 TRANSFERASE (AST) (SGOT): CPT | Performed by: NURSE PRACTITIONER

## 2023-03-28 PROCEDURE — 99232 PR SUBSEQUENT HOSPITAL CARE,LEVL II: ICD-10-PCS | Mod: ,,, | Performed by: INTERNAL MEDICINE

## 2023-03-28 PROCEDURE — 80053 COMPREHEN METABOLIC PANEL: CPT | Performed by: NURSE PRACTITIONER

## 2023-03-28 PROCEDURE — 63600175 PHARM REV CODE 636 W HCPCS: Performed by: HOSPITALIST

## 2023-03-28 PROCEDURE — 80143 DRUG ASSAY ACETAMINOPHEN: CPT | Performed by: INTERNAL MEDICINE

## 2023-03-28 PROCEDURE — 85025 COMPLETE CBC W/AUTO DIFF WBC: CPT | Performed by: INTERNAL MEDICINE

## 2023-03-28 PROCEDURE — 25000003 PHARM REV CODE 250: Performed by: INTERNAL MEDICINE

## 2023-03-28 PROCEDURE — 99232 SBSQ HOSP IP/OBS MODERATE 35: CPT | Mod: ,,, | Performed by: INTERNAL MEDICINE

## 2023-03-28 PROCEDURE — 63600175 PHARM REV CODE 636 W HCPCS: Performed by: NURSE PRACTITIONER

## 2023-03-28 PROCEDURE — 85610 PROTHROMBIN TIME: CPT | Performed by: NURSE PRACTITIONER

## 2023-03-28 PROCEDURE — 82140 ASSAY OF AMMONIA: CPT | Performed by: NURSE PRACTITIONER

## 2023-03-28 RX ORDER — ACETAMINOPHEN 500 MG
1000 TABLET ORAL EVERY 6 HOURS PRN
Status: DISCONTINUED | OUTPATIENT
Start: 2023-03-28 | End: 2023-03-29 | Stop reason: HOSPADM

## 2023-03-28 RX ORDER — SIMETHICONE 80 MG
1 TABLET,CHEWABLE ORAL 3 TIMES DAILY PRN
Status: DISCONTINUED | OUTPATIENT
Start: 2023-03-28 | End: 2023-03-29 | Stop reason: HOSPADM

## 2023-03-28 RX ORDER — GUAIFENESIN/DEXTROMETHORPHAN 100-10MG/5
10 SYRUP ORAL EVERY 6 HOURS PRN
Status: DISCONTINUED | OUTPATIENT
Start: 2023-03-28 | End: 2023-03-29 | Stop reason: HOSPADM

## 2023-03-28 RX ORDER — BISACODYL 5 MG
10 TABLET, DELAYED RELEASE (ENTERIC COATED) ORAL DAILY PRN
Status: DISCONTINUED | OUTPATIENT
Start: 2023-03-28 | End: 2023-03-29 | Stop reason: HOSPADM

## 2023-03-28 RX ORDER — ONDANSETRON 2 MG/ML
8 INJECTION INTRAMUSCULAR; INTRAVENOUS EVERY 6 HOURS PRN
Status: DISCONTINUED | OUTPATIENT
Start: 2023-03-28 | End: 2023-03-29 | Stop reason: HOSPADM

## 2023-03-28 RX ORDER — TRAZODONE HYDROCHLORIDE 50 MG/1
50 TABLET ORAL NIGHTLY PRN
Status: DISCONTINUED | OUTPATIENT
Start: 2023-03-28 | End: 2023-03-29 | Stop reason: HOSPADM

## 2023-03-28 RX ADMIN — ONDANSETRON 8 MG: 2 INJECTION INTRAMUSCULAR; INTRAVENOUS at 02:03

## 2023-03-28 RX ADMIN — ONDANSETRON 8 MG: 2 INJECTION INTRAMUSCULAR; INTRAVENOUS at 03:03

## 2023-03-28 RX ADMIN — MUPIROCIN: 20 OINTMENT TOPICAL at 08:03

## 2023-03-28 RX ADMIN — ACETYLCYSTEINE 7300 MG: 200 INJECTION INTRAVENOUS at 10:03

## 2023-03-28 RX ADMIN — POLYETHYLENE GLYCOL 3350 17 G: 17 POWDER, FOR SOLUTION ORAL at 09:03

## 2023-03-28 NOTE — PROGRESS NOTES
[facility]  Gastroenterology  Progress Note    Patient Name: Tristin Desai  MRN: [id number]  Admission Date: 3/26/2023  Hospital Length of Stay: 2 days  Code Status: Full Code   Attending Provider: Kirby Arenas MD  Consulting Provider:Yaya Barry MD  Primary Care Physician: Primary Doctor No  Principal Problem: Overdose   Pt seen and examined. Remains alert and ate solid meal today. However, his liver function has worsened with INR up to 3.3 and transaminase elevations as noted below. Renal function stable with no acidosis. Concerned about progressive fulminant liver failure given  reported large APAP ingestion (3+ times typical lethal dose) and delayed presentation. Agree with transfer to tertiary center with liver transplant availability for their evaluation. Discussed with pt and his father.      3/28/23-Patieint is seen, in ICU, up wandering around room with multiple staff members present following initiation of a code strong. He reportedly was wanting to go outside to smoke and when he wasnt allowed to he threatened to leave so he could go home and die. He was assisted back to his room and reportedly calmed down. Note at this time there is efforts to have patient transferred to [facility] due to worsening of his liver function overnight. His INR is elevated at 3.3 as well as further elevation in his transaminases with AST 4671, ALT 8802. His bilirubin is down at 0.9. Tylenol level remains down as well at 2. He has been complaining of some generalized malaise and nausea with no reports of vomiting. Denies any significant abdominal pain.    History reviewed. No pertinent past medical history.    History reviewed. No pertinent surgical history.    Review of patient's allergies indicates:  No Known Allergies  Family History    None       Tobacco Use    Smoking status: Every Day     Types: Cigarettes    Smokeless tobacco: Never   Substance and Sexual Activity    Alcohol use: Not Currently     Drug use: Not Currently    Sexual activity: Not Currently     Review of Systems   Constitutional:  Negative for activity change, appetite change, fever and unexpected weight change.   HENT:  Negative for nosebleeds and trouble swallowing.    Eyes:  Negative for visual disturbance.   Respiratory:  Negative for cough and shortness of breath.    Cardiovascular:  Negative for chest pain.   Gastrointestinal:  Positive for abdominal pain and nausea. Negative for abdominal distention, anal bleeding, blood in stool, constipation, diarrhea and vomiting.   Endocrine: Negative for cold intolerance.   Genitourinary:  Negative for dysuria, frequency, hematuria and urgency.   Musculoskeletal:  Negative for arthralgias and myalgias.   Skin:  Negative for color change.   Neurological:  Negative for speech difficulty, weakness and headaches.   Psychiatric/Behavioral:  Positive for suicidal ideas (On admission. States no longer). Negative for agitation, confusion and hallucinations.    Objective:     Vital Signs (Most Recent):  Temp: 97.9 °F (36.6 °C) (03/28/23 1420)  Pulse: 71 (03/28/23 1445)  Resp: 19 (03/28/23 1445)  BP: 107/61 (03/28/23 1445)  SpO2: 95 % (03/28/23 1445) Vital Signs (24h Range):  Temp:  [97.9 °F (36.6 °C)-99.2 °F (37.3 °C)] 97.9 °F (36.6 °C)  Pulse:  [] 71  Resp:  [9-22] 19  SpO2:  [93 %-100 %] 95 %  BP: (105-162)/() 107/61     Weight: 72 kg (158 lb 11.7 oz) (03/28/23 0303)  Body mass index is 24.85 kg/m².      Intake/Output Summary (Last 24 hours) at 3/28/2023 1459  Last data filed at 3/28/2023 1109  Gross per 24 hour   Intake 1432.29 ml   Output 3550 ml   Net -2117.71 ml         Lines/Drains/Airways       Peripheral Intravenous Line  Duration                  Peripheral IV - Single Lumen 03/26/23 1452 18 G Left Antecubital 2 days         Peripheral IV - Single Lumen 03/26/23 1739 20 G Anterior;Left Forearm 1 day                    Physical Exam  Vitals and nursing note reviewed.   Constitutional:        General: He is not in acute distress.     Appearance: Normal appearance. He is normal weight. He is not ill-appearing.   HENT:      Head: Normocephalic.      Nose: Nose normal. No congestion or rhinorrhea.      Mouth/Throat:      Mouth: Mucous membranes are moist.      Pharynx: Oropharynx is clear. No oropharyngeal exudate or posterior oropharyngeal erythema.   Eyes:      General: No scleral icterus.     Pupils: Pupils are equal, round, and reactive to light.   Cardiovascular:      Rate and Rhythm: Normal rate and regular rhythm.      Heart sounds: No murmur heard.  Pulmonary:      Effort: Pulmonary effort is normal.      Breath sounds: Normal breath sounds. No wheezing, rhonchi or rales.   Abdominal:      General: Abdomen is flat. Bowel sounds are normal. There is no distension.      Palpations: Abdomen is soft. There is no mass.      Tenderness: There is no abdominal tenderness.   Musculoskeletal:         General: Normal range of motion.      Cervical back: Normal range of motion. No tenderness.      Right lower leg: No edema.      Left lower leg: No edema.   Skin:     General: Skin is warm.      Coloration: Skin is not jaundiced or pale.   Neurological:      General: No focal deficit present.      Mental Status: He is alert and oriented to person, place, and time. Mental status is at baseline.   Psychiatric:         Mood and Affect: Mood is anxious.         Behavior: Behavior is uncooperative.         Judgment: Judgment is impulsive.       Significant Labs:  Recent Lab Results  (Last 5 results in the past 24 hours)        03/28/23  1356   03/28/23  1152   03/28/23  0746   03/28/23  0500   03/28/23  0459        Albumin/Globulin Ratio   1.0     1.1         Acetaminophen (Tylenol), Serum   <2   <2   <2         Albumin   3.2     3.4         Alkaline Phosphatase   188     188         ALT 8,802   10,361     10,581         Ammonia   72             Anion Gap   14     14         AST 4,671   6,276     9,629          Baso #       0.03         Basophil %       0.3         BILIRUBIN TOTAL   0.9     1.1         BUN   9     10         BUN/CREAT RATIO   11     13         Calcium   9.0     9.1         Chloride   99     100         CO2   26     26         Creatinine   0.83     0.77         Differential Type       Auto         eGFR   121     124         Eos #       0.03         Eosinophil %       0.3         Globulin, Total   3.1     3.2         Glucose   148     106         Hematocrit       46.5         Hemoglobin       15.3         Immature Grans (Abs)       0.05         Immature Granulocytes       0.4         INR         3.38       Lymph #       0.87         Lymph %       7.6         Magnesium       1.8         MCH       29.7         MCHC       32.9         MCV       90.1         Mono #       0.75         Mono %       6.6         MPV       10.6         Neutrophils, Abs       9.65         Neutrophils Relative       84.8         nRBC       0.0         NUCLEATED RBC ABSOLUTE       0.00         Platelets       347         Potassium   3.8     4.1         PROTEIN TOTAL   6.3     6.6         Protime         32.7       RBC       5.16         RDW       13.5         Sodium   135     136         WBC       11.38                                Significant Imaging:  Imaging results within the past 24 hours have been reviewed.    Assessment/Plan:     Psychiatric  Intentional acetaminophen overdose  3/28/23-labs reviewed for today. Pt continues with nausea, generally not feeling well. Possibility of transfer to [facility] pending. Continue with current treatment plan and close monitoring of INR/LFTs at this time. Further plan and addendum to follow by Dr Barry.     3/27/2023-patient admitted following Tylenol overdose.  Labs reviewed as above.  Patient is receiving N-acetylcysteine.  No further nausea or vomiting.  Imaging is reviewed.  We will continue to trend LFTs at this time.  Continue to avoid hepatotoxic medications when possible.  Will review  case further with Dr. Barry, with plan and addendum to follow.        Thank you for your consult. I will follow-up with patient. Please contact us if you have any additional questions.    CYNTHIA Fernandez  Gastroenterology  [facility]

## 2023-03-28 NOTE — DISCHARGE SUMMARY
[facility]  Pulmonology  Discharge Summary      Patient Name: Tristin Desai  MRN: [id number]  Admission Date: 3/26/2023  Hospital Length of Stay: 2 days  Discharge Date and Time: No discharge date for patient encounter.  Attending Physician: Kirby Arenas MD   Discharging Provider: MICHELLE VarelaAGACNP  Primary Care Provider: Primary Doctor No    HPI:  32-year-old white male who took a large 5. Mg tolerate states that after being home for several hours he would nausea and vomiting called by 911.  This was done because he was unable to see his children he denies any fever chills.  Poison Center was contacted he was placed on Mucomyst.  Patient denies any other problems at this time.  Workup in the ER showed [place] [address], INR 1.56, chemistry profile showed glucose 154 alk-phos 176 total bili of 2.8 AST 47 an ALT of 729 had no alcohol in his system      * No surgery found *    Indwelling Lines/Drains at Time of Discharge:   Lines/Drains/Airways     None                 Hospital Course:  Mr Desai has met maximal benefit from this hospitalization. He has been accepted to [facility] for liver transplant evaluation. He will remain with suicide precautions. He was an intentional overdose with tylenol- his liver enzymes have drastically increased since admit. He has continued on his mucomyst with the guidance of poison control. GI was also following. He is aware of transfer to [facility] and agrees at present.       Goals of Care Treatment Preferences:  Code Status: Full Code      Consults (From admission, onward)        Status Ordering Provider     Inpatient consult to Gastroenterology  Once        Provider:  (Not yet assigned)    Completed SHERRY THOMAS          Significant Labs:  All pertinent labs within the past 24 hours have been reviewed.    Significant Imaging:  I have reviewed all pertinent imaging results/findings within the past 24 hours.    Pending Diagnostic Studies:      Procedure Component Value Units Date/Time    Acetaminophen Level [[id number]]     Order Status: Sent Lab Status: No result     Specimen: Blood     EXTRA TUBES [[id number]] Collected: 03/28/23 0808    Order Status: Sent Lab Status: In process Updated: 03/28/23 0808    Specimen: Blood, Venous     Narrative:      The following orders were created for panel order EXTRA TUBES.  Procedure                               Abnormality         Status                     ---------                               -----------         ------                     Light Green Top Hold[[id number]]                             In process                   Please view results for these tests on the individual orders.    EXTRA TUBES [[id number]] Collected: 03/27/23 1602    Order Status: Sent Lab Status: In process Updated: 03/27/23 1602    Specimen: Blood, Venous     Narrative:      The following orders were created for panel order EXTRA TUBES.  Procedure                               Abnormality         Status                     ---------                               -----------         ------                     Gold Top Hold[[id number]]                                    In process                   Please view results for these tests on the individual orders.    EXTRA TUBES [[id number]] Collected: 03/27/23 0429    Order Status: Sent Lab Status: In process Updated: 03/27/23 0542    Specimen: Blood, Venous     Narrative:      The following orders were created for panel order EXTRA TUBES.  Procedure                               Abnormality         Status                     ---------                               -----------         ------                     Light Green Top Hold[[id number]]                             In process                 Lavender Top Hold[[id number]]                                In process                   Please view results for these tests on the individual orders.        Final Active Diagnoses:    Diagnosis Date  Noted POA    PRINCIPAL PROBLEM:  Overdose [T50.901A] 03/26/2023 Yes    Leukocytosis [D72.829] 03/27/2023 Unknown    Intentional acetaminophen overdose [T39.1X2A] 03/26/2023 Yes    Suicide attempt [T14.91XA] 03/26/2023 Unknown      Problems Resolved During this Admission:       Discharged Condition: critical    Disposition: Short Term Hospital    Follow Up:    Patient Instructions:      Reason for not Ordering Smoking Cessation Referral     Order Specific Question Answer Comments   Reason for not ordering: Not medically appropriate at this time      Reason for not Prescribing Nicotine Replacement     Order Specific Question Answer Comments   Reason for not Prescribing: Not medically appropriate at this time      Medications:  Transfer Medications (for Discharge Readmit only):   Current Facility-Administered Medications   Medication Dose Route Frequency Provider Last Rate Last Admin    acetaminophen tablet 1,000 mg  1,000 mg Oral Q6H PRN Robyn Rogers MD        acetylcysteine 20% (200 mg/ml) (ACETADOTE) 7,300 mg in dextrose 5 % (D5W) 1,000 mL infusion  100 mg/kg Intravenous Once CYNTHIA Varela-AGACNP 62.5 mL/hr at 03/28/23 1032 7,300 mg at 03/28/23 1032    bisacodyL EC tablet 10 mg  10 mg Oral Daily PRN Robyn Rogers MD        dextromethorphan-guaiFENesin  mg/5 ml liquid 10 mL  10 mL Oral Q6H PRN Robyn Rogers MD        dextrose 10% bolus 250 mL 250 mL  25 g Intravenous PRN Kirby Arenas MD        glucagon (human recombinant) injection 1 mg  1 mg Intramuscular PRN Kirby Arenas MD        magnesium oxide tablet 800 mg  800 mg Oral PRN Kirby Arenas MD        magnesium oxide tablet 800 mg  800 mg Oral PRN Kirby Arenas MD        melatonin tablet 6 mg  6 mg Oral Nightly PRN Kirby Arenas MD        mupirocin 2 % ointment   Nasal BID Kirby Arenas MD   Given at 03/28/23 0849    naloxone 0.4 mg/mL injection 0.02 mg  0.02 mg Intravenous PRAMY Orr  Siddhartha Arenas MD        nicotine 21 mg/24 hr 1 patch  1 patch Transdermal Daily Kayla Solorzano, -ACNP   1 patch at 03/27/23 1402    ondansetron injection 8 mg  8 mg Intravenous Q6H PRN Robyn Rogers MD   8 mg at 03/28/23 1559    polyethylene glycol packet 17 g  17 g Oral Daily Kirby Arenas MD   17 g at 03/28/23 0920    potassium bicarbonate disintegrating tablet 35 mEq  35 mEq Oral PRN Kirby Arenas MD        potassium bicarbonate disintegrating tablet 50 mEq  50 mEq Oral PRN Kirby Arenas MD        potassium bicarbonate disintegrating tablet 60 mEq  60 mEq Oral PRN Kirby Arenas MD        potassium, sodium phosphates 280-160-250 mg packet 2 packet  2 packet Oral PRN Kirby Arenas MD        potassium, sodium phosphates 280-160-250 mg packet 2 packet  2 packet Oral PRN Kirby Arenas MD        potassium, sodium phosphates 280-160-250 mg packet 2 packet  2 packet Oral PRN Kirby Arenas MD        simethicone chewable tablet 80 mg  1 tablet Oral TID PRN Robyn Rogers MD        sodium chloride 0.9% flush 10 mL  10 mL Intravenous PRN Kirby Arenas MD        traZODone tablet 50 mg  50 mg Oral Nightly PRN MD Marielos Isabel, CYNTHIA-[facility]  Pulmonology  [facility]

## 2023-03-28 NOTE — ASSESSMENT & PLAN NOTE
Patient overdosed on Tylenol do a home situation where his children were not able to be seen  - no suicidal ideations; continue with maria elena

## 2023-03-28 NOTE — HOSPITAL COURSE
Mr Desai has met maximal benefit from this hospitalization. He has been accepted to [facility] for liver transplant evaluation. He will remain with suicide precautions. He was an intentional overdose with tylenol- his liver enzymes have drastically increased since admit. He has continued on his mucomyst with the guidance of poison control. GI was also following. He is aware of transfer to [facility] and agrees at present.

## 2023-03-28 NOTE — PROGRESS NOTES
[facility]  Pulmonology  Progress Note    Patient Name: Tristin Desai  MRN: [id number]  Admission Date: 3/26/2023  Hospital Length of Stay: 2 days  Code Status: Full Code  Attending Provider: Kirby Arenas MD  Primary Care Provider: Primary Doctor No   Principal Problem: Overdose    Subjective:     Interval History:  Patient nauseated      Objective:     Vital Signs (Most Recent):  Temp: 99.2 °F (37.3 °C) (03/28/23 0303)  Pulse: 65 (03/28/23 0303)  Resp: 19 (03/28/23 0303)  BP: 118/67 (03/28/23 0303)  SpO2: 96 % (03/28/23 0303) Vital Signs (24h Range):  Temp:  [97.8 °F (36.6 °C)-99.2 °F (37.3 °C)] 99.2 °F (37.3 °C)  Pulse:  [] 65  Resp:  [9-20] 19  SpO2:  [94 %-99 %] 96 %  BP: (116-151)/() 118/67     Weight: 72 kg (158 lb 11.7 oz)  Body mass index is 24.85 kg/m².      Intake/Output Summary (Last 24 hours) at 3/28/2023 0603  Last data filed at 3/28/2023 0530  Gross per 24 hour   Intake 2710.42 ml   Output 3800 ml   Net -1089.58 ml       Physical Exam  Vitals reviewed.   Constitutional:       Appearance: Normal appearance.      Interventions: He is not intubated.  HENT:      Head: Normocephalic and atraumatic.      Nose: Nose normal.      Mouth/Throat:      Mouth: Mucous membranes are dry.      Pharynx: Oropharynx is clear.   Eyes:      Extraocular Movements: Extraocular movements intact.      Conjunctiva/sclera: Conjunctivae normal.      Pupils: Pupils are equal, round, and reactive to light.   Cardiovascular:      Rate and Rhythm: Normal rate.      Heart sounds: Normal heart sounds. No murmur heard.  Pulmonary:      Effort: Pulmonary effort is normal. He is not intubated.      Breath sounds: Normal breath sounds.   Abdominal:      General: Abdomen is flat. Bowel sounds are normal.      Palpations: Abdomen is soft.   Musculoskeletal:         General: Normal range of motion.      Cervical back: Normal range of motion and neck supple.      Right lower leg: No edema.      Left  lower leg: No edema.   Skin:     General: Skin is warm and dry.      Capillary Refill: Capillary refill takes less than 2 seconds.   Neurological:      General: No focal deficit present.      Mental Status: He is alert and oriented to person, place, and time.   Psychiatric:         Mood and Affect: Mood normal.         Behavior: Behavior normal.     Review of Systems    Vents:       Lines/Drains/Airways       Peripheral Intravenous Line  Duration                  Peripheral IV - Single Lumen 03/26/23 1452 18 G Left Antecubital 1 day         Peripheral IV - Single Lumen 03/26/23 1739 20 G Anterior;Left Forearm 1 day                    Significant Labs:    CBC/Anemia Profile:  Recent Labs   Lab 03/26/23  1435 03/26/23  1647   WBC 17.57*  --    HGB 16.1  --    HCT 49.8 51   *  --    MCV 91.5  --    RDW 13.6  --         Chemistries:  Recent Labs   Lab 03/26/23  1435 03/27/23  0957 03/27/23  1209   *  --  137   K 4.9  --  4.4   CL 97*  --  100   CO2 25  --  29   BUN 13  --  11   CREATININE 0.95  --  0.93   CALCIUM 9.2  --  9.1   ALBUMIN 3.9 3.3* 3.3*   PROT 7.0 6.7 6.4   BILITOT 2.8* 1.4* 1.3*   ALKPHOS 176* 166* 166*   * 1,541* 1,578*   * 910* 896*   PHOS 3.9  --   --        Recent Lab Results  (Last 5 results in the past 24 hours)        03/28/23  0008   03/27/23  1955   03/27/23  1558   03/27/23  1209   03/27/23  0957        Albumin/Globulin Ratio       1.1         Acetaminophen (Tylenol), Serum <2   <2   <2   2         Albumin       3.3   3.3       Alkaline Phosphatase       166   166       ALT       1,578   1,541       Anion Gap       12         AST       896   910       Bilirubin Direct         0.7       BILIRUBIN TOTAL       1.3   1.4       BUN       11         BUN/CREAT RATIO       12         Calcium       9.1         Chloride       100         CO2       29         Creatinine       0.93         eGFR       113         Globulin, Total       3.1         Glucose       120         INR      2.00           Potassium       4.4         PROTEIN TOTAL       6.4   6.7       Protime     21.8           Sodium       137                                Significant Imaging:  I have reviewed all pertinent imaging results/findings within the past 24 hours.    Assessment/Plan:     Psychiatric  Suicide attempt  Situation problems at home    Intentional acetaminophen overdose  Appreciate Dr. Daniels's help basically were giving Mucomyst IV being followed by poison control staying away from any nephrotoxic drugs lab pending today    Oncology  Leukocytosis  Lab pending today no fever    Other  * Overdose  Patient overdosed on Tylenol do a home situation where his children were not able to be seen                 Ryan Bai MD  Pulmonology  [facility]

## 2023-03-28 NOTE — ASSESSMENT & PLAN NOTE
3/28/23-labs reviewed for today. Pt continues with nausea, generally not feeling well. Possibility of transfer to [facility] pending. Continue with current treatment plan and close monitoring of INR/LFTs at this time. Further plan and addendum to follow by Dr Barry.     3/27/2023-patient admitted following Tylenol overdose.  Labs reviewed as above.  Patient is receiving N-acetylcysteine.  No further nausea or vomiting.  Imaging is reviewed.  We will continue to trend LFTs at this time.  Continue to avoid hepatotoxic medications when possible.  Will review case further with Dr. Barry, with plan and addendum to follow.

## 2023-03-28 NOTE — ASSESSMENT & PLAN NOTE
Appreciate Dr. Daniels's help basically were giving Mucomyst IV being followed by poison control staying away from any nephrotoxic drugs lab pending today

## 2023-03-28 NOTE — SUBJECTIVE & OBJECTIVE
History reviewed. No pertinent past medical history.    History reviewed. No pertinent surgical history.    Review of patient's allergies indicates:  No Known Allergies  Family History    None       Tobacco Use    Smoking status: Every Day     Types: Cigarettes    Smokeless tobacco: Never   Substance and Sexual Activity    Alcohol use: Not Currently    Drug use: Not Currently    Sexual activity: Not Currently     Review of Systems   Constitutional:  Negative for activity change, appetite change, fever and unexpected weight change.   HENT:  Negative for nosebleeds and trouble swallowing.    Eyes:  Negative for visual disturbance.   Respiratory:  Negative for cough and shortness of breath.    Cardiovascular:  Negative for chest pain.   Gastrointestinal:  Positive for abdominal pain and nausea. Negative for abdominal distention, anal bleeding, blood in stool, constipation, diarrhea and vomiting.   Endocrine: Negative for cold intolerance.   Genitourinary:  Negative for dysuria, frequency, hematuria and urgency.   Musculoskeletal:  Negative for arthralgias and myalgias.   Skin:  Negative for color change.   Neurological:  Negative for speech difficulty, weakness and headaches.   Psychiatric/Behavioral:  Positive for suicidal ideas (On admission. States no longer). Negative for agitation, confusion and hallucinations.    Objective:     Vital Signs (Most Recent):  Temp: 97.9 °F (36.6 °C) (03/28/23 1420)  Pulse: 71 (03/28/23 1445)  Resp: 19 (03/28/23 1445)  BP: 107/61 (03/28/23 1445)  SpO2: 95 % (03/28/23 1445) Vital Signs (24h Range):  Temp:  [97.9 °F (36.6 °C)-99.2 °F (37.3 °C)] 97.9 °F (36.6 °C)  Pulse:  [] 71  Resp:  [9-22] 19  SpO2:  [93 %-100 %] 95 %  BP: (105-162)/() 107/61     Weight: 72 kg (158 lb 11.7 oz) (03/28/23 0303)  Body mass index is 24.85 kg/m².      Intake/Output Summary (Last 24 hours) at 3/28/2023 1459  Last data filed at 3/28/2023 1109  Gross per 24 hour   Intake 1432.29 ml   Output 3550  ml   Net -2117.71 ml         Lines/Drains/Airways       Peripheral Intravenous Line  Duration                  Peripheral IV - Single Lumen 03/26/23 1452 18 G Left Antecubital 2 days         Peripheral IV - Single Lumen 03/26/23 1739 20 G Anterior;Left Forearm 1 day                    Physical Exam  Vitals and nursing note reviewed.   Constitutional:       General: He is not in acute distress.     Appearance: Normal appearance. He is normal weight. He is not ill-appearing.   HENT:      Head: Normocephalic.      Nose: Nose normal. No congestion or rhinorrhea.      Mouth/Throat:      Mouth: Mucous membranes are moist.      Pharynx: Oropharynx is clear. No oropharyngeal exudate or posterior oropharyngeal erythema.   Eyes:      General: No scleral icterus.     Pupils: Pupils are equal, round, and reactive to light.   Cardiovascular:      Rate and Rhythm: Normal rate and regular rhythm.      Heart sounds: No murmur heard.  Pulmonary:      Effort: Pulmonary effort is normal.      Breath sounds: Normal breath sounds. No wheezing, rhonchi or rales.   Abdominal:      General: Abdomen is flat. Bowel sounds are normal. There is no distension.      Palpations: Abdomen is soft. There is no mass.      Tenderness: There is no abdominal tenderness.   Musculoskeletal:         General: Normal range of motion.      Cervical back: Normal range of motion. No tenderness.      Right lower leg: No edema.      Left lower leg: No edema.   Skin:     General: Skin is warm.      Coloration: Skin is not jaundiced or pale.   Neurological:      General: No focal deficit present.      Mental Status: He is alert and oriented to person, place, and time. Mental status is at baseline.   Psychiatric:         Mood and Affect: Mood is anxious.         Behavior: Behavior is uncooperative.         Judgment: Judgment is impulsive.       Significant Labs:  Recent Lab Results  (Last 5 results in the past 24 hours)        03/28/23  1356   03/28/23  1152    03/28/23  0746   03/28/23  0500   03/28/23  0459        Albumin/Globulin Ratio   1.0     1.1         Acetaminophen (Tylenol), Serum   <2   <2   <2         Albumin   3.2     3.4         Alkaline Phosphatase   188     188         ALT 8,802   10,361     10,581         Ammonia   72             Anion Gap   14     14         AST 4,671   6,276     9,629         Baso #       0.03         Basophil %       0.3         BILIRUBIN TOTAL   0.9     1.1         BUN   9     10         BUN/CREAT RATIO   11     13         Calcium   9.0     9.1         Chloride   99     100         CO2   26     26         Creatinine   0.83     0.77         Differential Type       Auto         eGFR   121     124         Eos #       0.03         Eosinophil %       0.3         Globulin, Total   3.1     3.2         Glucose   148     106         Hematocrit       46.5         Hemoglobin       15.3         Immature Grans (Abs)       0.05         Immature Granulocytes       0.4         INR         3.38       Lymph #       0.87         Lymph %       7.6         Magnesium       1.8         MCH       29.7         MCHC       32.9         MCV       90.1         Mono #       0.75         Mono %       6.6         MPV       10.6         Neutrophils, Abs       9.65         Neutrophils Relative       84.8         nRBC       0.0         NUCLEATED RBC ABSOLUTE       0.00         Platelets       347         Potassium   3.8     4.1         PROTEIN TOTAL   6.3     6.6         Protime         32.7       RBC       5.16         RDW       13.5         Sodium   135     136         WBC       11.38                                Significant Imaging:  Imaging results within the past 24 hours have been reviewed.

## 2023-03-28 NOTE — PT/OT/SLP EVAL
Occupational Therapy   Evaluation and Discharge Note    Name: Tristin Desai  MRN: [id number]  Admitting Diagnosis: Overdose  Recent Surgery: * No surgery found *      Recommendations:     Discharge Recommendations: home  Discharge Equipment Recommendations: none  Barriers to discharge:   (on going treatment)    Assessment:     Tristin Desai is a 32 y.o. male with a medical diagnosis of Overdose. At this time, patient is functioning at their prior level of function and does not require further acute OT services.     Plan:     During this hospitalization, patient does not require further acute OT services.  Please re-consult if situation changes.    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  Patient found HOB elevated with peripheral IV, telemetry, pulse ox (continuous), blood pressure cuff upon OT entry to room.    General Precautions: Standard, fall (suicide)  Orthopedic Precautions: N/A  Braces: N/A  Respiratory Status: Room air     Occupational Performance:    Bed Mobility:    Patient completed Rolling/Turning to Left with  independence  Patient completed Rolling/Turning to Right with independence  Patient completed Supine to Sit with stand by assistance  Patient completed Sit to Supine with stand by assistance    Functional Mobility/Transfers:  Patient completed Sit <> Stand Transfer with  stand by assistance  with  no assistive device   Functional Mobility: Pt ambulated around the ICU with CGA improving to SBA     Activities of Daily Living:  Grooming: independence to wash face, hands, and feet with wet wash cloth  Upper Body Dressing: minimum assistance due to lines  Lower Body Dressing: independence with socks    Cognitive/Visual Perceptual:  Cognitive/Psychosocial Skills:     -       Oriented to: Person, Place, and Situation   -       Follows Commands/attention:Follows one-step commands  -       Communication: clear/fluent  -       Safety awareness/insight to disability: impaired   -       Mood/Affect/Coping skills/emotional control: Cooperative, Anxious, and impulsive    Physical Exam:  Sensation:    -       Intact  Motor Planning:    -       intact  Dominant hand:    -       right  Upper Extremity Range of Motion:     -       Right Upper Extremity: WFL  -       Left Upper Extremity: WFL  Upper Extremity Strength:    -       Right Upper Extremity: WFL  -       Left Upper Extremity: WFL   Strength:    -       Right Upper Extremity: WFL  -       Left Upper Extremity: WFL  Fine Motor Coordination:    -       Intact  Gross motor coordination:   WFL    AMPAC 6 Click ADL:  AMPAC Total Score:      Treatment & Education:  Pt educated on OT role/POC.   Importance of OOB activity with staff assistance.  Importance of sitting up in the chair throughout the day as tolerated, especially for meals   Safety during functional t/f and mobility  Importance of assisting with self-care activities   All questions/concerns answered within OT scope of practice     Patient left HOB elevated with all lines intact    GOALS:   Multidisciplinary Problems       Occupational Therapy Goals          Problem: Occupational Therapy    Goal Priority Disciplines Outcome Interventions   Occupational Therapy Goal     OT, PT/OT                         History:     History reviewed. No pertinent past medical history.    History  reviewed. No pertinent surgical history.    Time Tracking:     OT Date of Treatment: 03/27/23  OT Start Time: 0856  OT Stop Time: 0910  OT Total Time (min): 14 min    Billable Minutes:Evaluation OT low complexity    3/28/2023

## 2023-03-28 NOTE — ASSESSMENT & PLAN NOTE
Appreciate Dr. Daniels's help basically were giving Mucomyst IV being followed by poison control staying away from any nephrotoxic drugs lab pending today  - tylenol lvl <2  - AST peaked 9629  - ALT peaked [id number]

## 2023-03-28 NOTE — PROGRESS NOTES
Pt  pulled off lead, and wires angry because he couldn't go out and smoke, explained that he could not go smoke, pt states he will sign out explained that he could not sign out because he was a danger to himself, he stated he just wanted to go home and die, reminded him that we here to help him. He said I just got some bad news and I need to smoke, he begin pacing in room. Aleyda rodrigues ambuated pt in halls, called dr link. Theresa stover has just explained to him she was working to transfer him to [facility], because of his liver enymes.  Pt was calmer after he ambulated with aleyda

## 2023-03-28 NOTE — PLAN OF CARE
Plan of care reviewed with patient.  Questions encouraged and answered. Patient stated understanding but will need further review.     Problem: Violence Risk or Actual  Goal: Anger and Impulse Control  Outcome: Ongoing, Progressing  Intervention: Minimize Safety Risk  Flowsheets (Taken 3/28/2023 0559)  Behavior Management: security enhancements provided  Sensory Stimulation Regulation:   care clustered   lighting decreased   television on  Enhanced Safety Measures:   bed alarm set   room near unit station    at bedside  Intervention: Promote Self-Control  Flowsheets (Taken 3/28/2023 0559)  Supportive Measures:   active listening utilized   positive reinforcement provided   problem-solving facilitated   self-care encouraged   relaxation techniques promoted   verbalization of feelings encouraged  Environmental Support:   calm environment promoted   caregiver consistency promoted   environmental consistency promoted     Problem: Adult Inpatient Plan of Care  Goal: Plan of Care Review  Outcome: Ongoing, Progressing  Goal: Patient-Specific Goal (Individualized)  Outcome: Ongoing, Progressing  Goal: Absence of Hospital-Acquired Illness or Injury  Outcome: Ongoing, Progressing  Goal: Optimal Comfort and Wellbeing  Outcome: Ongoing, Progressing  Goal: Readiness for Transition of Care  Outcome: Ongoing, Progressing     Problem: Nausea and Vomiting  Goal: Fluid and Electrolyte Balance  Outcome: Ongoing, Progressing  Intervention: Prevent and Manage Nausea and Vomiting  Flowsheets (Taken 3/28/2023 0559)  Nausea/Vomiting Interventions:   sips of clear liquids given   stimuli minimized   nausea triggers minimized  Environmental Support:   calm environment promoted   caregiver consistency promoted   environmental consistency promoted  Oral Care: oral rinse provided     Problem: Suicide Risk  Goal: Absence of Self-Harm  Outcome: Ongoing, Progressing     Problem: Suicidal Behavior  Goal: Suicidal Behavior is Absent  or Managed  Outcome: Ongoing, Progressing

## 2023-03-28 NOTE — PROGRESS NOTES
Spoke with tapia at pos[facility] control  he will receive another bag of mucomyst. Labs will drawn 2 hours before the bag is completed

## 2023-03-28 NOTE — ASSESSMENT & PLAN NOTE
Situation problems at home  - suicide precautions   - discussed the need for counseling upon d/c for mental health and drug problems

## 2023-03-28 NOTE — SUBJECTIVE & OBJECTIVE
Interval History:  Patient nauseated      Objective:     Vital Signs (Most Recent):  Temp: 99.2 °F (37.3 °C) (03/28/23 0303)  Pulse: 65 (03/28/23 0303)  Resp: 19 (03/28/23 0303)  BP: 118/67 (03/28/23 0303)  SpO2: 96 % (03/28/23 0303) Vital Signs (24h Range):  Temp:  [97.8 °F (36.6 °C)-99.2 °F (37.3 °C)] 99.2 °F (37.3 °C)  Pulse:  [] 65  Resp:  [9-20] 19  SpO2:  [94 %-99 %] 96 %  BP: (116-151)/() 118/67     Weight: 72 kg (158 lb 11.7 oz)  Body mass index is 24.85 kg/m².      Intake/Output Summary (Last 24 hours) at 3/28/2023 0603  Last data filed at 3/28/2023 0530  Gross per 24 hour   Intake 2710.42 ml   Output 3800 ml   Net -1089.58 ml       Physical Exam  Vitals reviewed.   Constitutional:       Appearance: Normal appearance.      Interventions: He is not intubated.  HENT:      Head: Normocephalic and atraumatic.      Nose: Nose normal.      Mouth/Throat:      Mouth: Mucous membranes are dry.      Pharynx: Oropharynx is clear.   Eyes:      Extraocular Movements: Extraocular movements intact.      Conjunctiva/sclera: Conjunctivae normal.      Pupils: Pupils are equal, round, and reactive to light.   Cardiovascular:      Rate and Rhythm: Normal rate.      Heart sounds: Normal heart sounds. No murmur heard.  Pulmonary:      Effort: Pulmonary effort is normal. He is not intubated.      Breath sounds: Normal breath sounds.   Abdominal:      General: Abdomen is flat. Bowel sounds are normal.      Palpations: Abdomen is soft.   Musculoskeletal:         General: Normal range of motion.      Cervical back: Normal range of motion and neck supple.      Right lower leg: No edema.      Left lower leg: No edema.   Skin:     General: Skin is warm and dry.      Capillary Refill: Capillary refill takes less than 2 seconds.   Neurological:      General: No focal deficit present.      Mental Status: He is alert and oriented to person, place, and time.   Psychiatric:         Mood and Affect: Mood normal.         Behavior:  Behavior normal.     Review of Systems    Vents:       Lines/Drains/Airways       Peripheral Intravenous Line  Duration                  Peripheral IV - Single Lumen 03/26/23 1452 18 G Left Antecubital 1 day         Peripheral IV - Single Lumen 03/26/23 1739 20 G Anterior;Left Forearm 1 day                    Significant Labs:    CBC/Anemia Profile:  Recent Labs   Lab 03/26/23  1435 03/26/23  1647   WBC 17.57*  --    HGB 16.1  --    HCT 49.8 51   *  --    MCV 91.5  --    RDW 13.6  --         Chemistries:  Recent Labs   Lab 03/26/23  1435 03/27/23  0957 03/27/23  1209   *  --  137   K 4.9  --  4.4   CL 97*  --  100   CO2 25  --  29   BUN 13  --  11   CREATININE 0.95  --  0.93   CALCIUM 9.2  --  9.1   ALBUMIN 3.9 3.3* 3.3*   PROT 7.0 6.7 6.4   BILITOT 2.8* 1.4* 1.3*   ALKPHOS 176* 166* 166*   * 1,541* 1,578*   * 910* 896*   PHOS 3.9  --   --        Recent Lab Results  (Last 5 results in the past 24 hours)        03/28/23  0008   03/27/23  1955   03/27/23  1558   03/27/23  1209   03/27/23  0957        Albumin/Globulin Ratio       1.1         Acetaminophen (Tylenol), Serum <2   <2   <2   2         Albumin       3.3   3.3       Alkaline Phosphatase       166   166       ALT       1,578   1,541       Anion Gap       12         AST       896   910       Bilirubin Direct         0.7       BILIRUBIN TOTAL       1.3   1.4       BUN       11         BUN/CREAT RATIO       12         Calcium       9.1         Chloride       100         CO2       29         Creatinine       0.93         eGFR       113         Globulin, Total       3.1         Glucose       120         INR     2.00           Potassium       4.4         PROTEIN TOTAL       6.4   6.7       Protime     21.8           Sodium       137                                Significant Imaging:  I have reviewed all pertinent imaging results/findings within the past 24 hours.

## 2023-03-29 NOTE — PLAN OF CARE
[facility] ICU  Discharge Final Note    Primary Care Provider: Primary Doctor No    Expected Discharge Date: 3/28/2023    Final Discharge Note (most recent)       Final Note - 03/29/23 0820          Final Note    Assessment Type Final Discharge Note     Anticipated Discharge Disposition Short Term Hospital        Post-Acute Status    Post-Acute Authorization Placement     Post-Acute Placement Status Set-up Complete/Auth obtained     Patient choice form signed by patient/caregiver List with quality metrics by geographic area provided;List from CMS Compare;List from System Post-Acute Care     Discharge Delays None known at this time                     Important Message from Medicare             pt transferred to [facility]. 0 dc needs

## 2023-04-02 LAB
BACTERIA BLD CULT: NORMAL
BACTERIA BLD CULT: NORMAL